# Patient Record
Sex: FEMALE | Race: WHITE
[De-identification: names, ages, dates, MRNs, and addresses within clinical notes are randomized per-mention and may not be internally consistent; named-entity substitution may affect disease eponyms.]

---

## 2016-06-20 VITALS
DIASTOLIC BLOOD PRESSURE: 102 MMHG | DIASTOLIC BLOOD PRESSURE: 102 MMHG | SYSTOLIC BLOOD PRESSURE: 178 MMHG | SYSTOLIC BLOOD PRESSURE: 178 MMHG

## 2019-06-11 ENCOUNTER — HOSPITAL ENCOUNTER (OUTPATIENT)
Dept: HOSPITAL 61 - KCIC | Age: 54
Discharge: HOME | End: 2019-06-11
Attending: FAMILY MEDICINE
Payer: COMMERCIAL

## 2019-06-11 DIAGNOSIS — M25.761: ICD-10-CM

## 2019-06-11 DIAGNOSIS — M17.11: Primary | ICD-10-CM

## 2019-06-11 PROCEDURE — 73562 X-RAY EXAM OF KNEE 3: CPT

## 2019-06-12 NOTE — KCIC
KNEE RIGHT 3V

 

History: Right knee pain

 

Comparison: None.

 

Findings:

3 views right knee are submitted. There is mild osteoarthritic change of 

all 3 compartments, larger osteophytes of lateral compartment and 

patellofemoral articulation. No acute fracture or dislocation is 

identified. There is suspected small suprapatellar joint effusion.

 

Impression: 

 

1.  There is tricompartmental osteoarthritic change. There is suspected 

small suprapatellar joint effusion.

 

Electronically signed by: Franklyn Salinas MD (6/12/2019 10:00 AM) [facility]

## 2019-11-05 ENCOUNTER — HOSPITAL ENCOUNTER (OUTPATIENT)
Dept: HOSPITAL 61 - US | Age: 54
Discharge: HOME | End: 2019-11-05
Attending: FAMILY MEDICINE
Payer: COMMERCIAL

## 2019-11-05 DIAGNOSIS — I87.2: ICD-10-CM

## 2019-11-05 DIAGNOSIS — E11.622: Primary | ICD-10-CM

## 2019-11-05 DIAGNOSIS — L97.819: ICD-10-CM

## 2019-11-05 DIAGNOSIS — I70.291: ICD-10-CM

## 2019-11-05 PROCEDURE — 93926 LOWER EXTREMITY STUDY: CPT

## 2019-11-05 PROCEDURE — 93971 EXTREMITY STUDY: CPT

## 2019-11-05 NOTE — RAD
EXAM: 

1. Right lower extremity arterial Doppler.

2. Right lower extremity venous Doppler.

3. Right lower extremity venous reflux analysis.

 

HISTORY: Nonhealing right lower extremity ulcers.

 

COMPARISON: None.

 

FINDINGS: Grayscale and Doppler analysis of the right lower extremity 

arterial system was performed.

 

There are biphasic waveforms from the common femoral artery through the 

popliteal artery. Waveforms become monophasic within the trifurcation 

vessels. The dorsalis pedis artery is patent.

 

Grayscale and Doppler analysis of the right lower extremity deep venous 

system was performed with graded compression and augmentation. The common 

femoral, greater saphenous, superficial femoral, popliteal and calf veins 

were assessed.

 

There is no evidence of deep venous thrombosis.

 

The greater saphenous vein measures 7 mm at the junction. No reflux is 

observed with Valsalva. The lesser saphenous vein measures 3 mm. Two calf 

perforators measure 2 mm 3 cm proximal to the ankle and 3.5 mm 14 cm 

proximal to the ankle. Posteriorly another 26 cm proximal ankle measures 

2.5 mm. Subcutaneous edema is noted.

 

IMPRESSION: 

1. Findings consistent with mildly flow-limiting stenosis proximal to the 

right common femoral artery. They become significantly flow-limiting 

within the proximal trifurcation.

2. No evidence of right lower extremity deep venous thrombosis.

3. No greater saphenous reflux. Calf perforators as above.

 

Electronically signed by: BORIS Noguera MD (11/5/2019 8:38 AM) [facility]

## 2019-11-05 NOTE — RAD
EXAM: 

1. Right lower extremity arterial Doppler.

2. Right lower extremity venous Doppler.

3. Right lower extremity venous reflux analysis.

 

HISTORY: Nonhealing right lower extremity ulcers.

 

COMPARISON: None.

 

FINDINGS: Grayscale and Doppler analysis of the right lower extremity 

arterial system was performed.

 

There are biphasic waveforms from the common femoral artery through the 

popliteal artery. Waveforms become monophasic within the trifurcation 

vessels. The dorsalis pedis artery is patent.

 

Grayscale and Doppler analysis of the right lower extremity deep venous 

system was performed with graded compression and augmentation. The common 

femoral, greater saphenous, superficial femoral, popliteal and calf veins 

were assessed.

 

There is no evidence of deep venous thrombosis.

 

The greater saphenous vein measures 7 mm at the junction. No reflux is 

observed with Valsalva. The lesser saphenous vein measures 3 mm. Two calf 

perforators measure 2 mm 3 cm proximal to the ankle and 3.5 mm 14 cm 

proximal to the ankle. Posteriorly another 26 cm proximal ankle measures 

2.5 mm. Subcutaneous edema is noted.

 

IMPRESSION: 

1. Findings consistent with mildly flow-limiting stenosis proximal to the 

right common femoral artery. They become significantly flow-limiting 

within the proximal trifurcation.

2. No evidence of right lower extremity deep venous thrombosis.

3. No greater saphenous reflux. Calf perforators as above.

 

Electronically signed by: BORIS Noguera MD (11/5/2019 8:38 AM) [facility]

## 2019-11-05 NOTE — RAD
EXAM: 

1. Right lower extremity arterial Doppler.

2. Right lower extremity venous Doppler.

3. Right lower extremity venous reflux analysis.

 

HISTORY: Nonhealing right lower extremity ulcers.

 

COMPARISON: None.

 

FINDINGS: Grayscale and Doppler analysis of the right lower extremity 

arterial system was performed.

 

There are biphasic waveforms from the common femoral artery through the 

popliteal artery. Waveforms become monophasic within the trifurcation 

vessels. The dorsalis pedis artery is patent.

 

Grayscale and Doppler analysis of the right lower extremity deep venous 

system was performed with graded compression and augmentation. The common 

femoral, greater saphenous, superficial femoral, popliteal and calf veins 

were assessed.

 

There is no evidence of deep venous thrombosis.

 

The greater saphenous vein measures 7 mm at the junction. No reflux is 

observed with Valsalva. The lesser saphenous vein measures 3 mm. Two calf 

perforators measure 2 mm 3 cm proximal to the ankle and 3.5 mm 14 cm 

proximal to the ankle. Posteriorly another 26 cm proximal ankle measures 

2.5 mm. Subcutaneous edema is noted.

 

IMPRESSION: 

1. Findings consistent with mildly flow-limiting stenosis proximal to the 

right common femoral artery. They become significantly flow-limiting 

within the proximal trifurcation.

2. No evidence of right lower extremity deep venous thrombosis.

3. No greater saphenous reflux. Calf perforators as above.

 

Electronically signed by: BORIS Noguera MD (11/5/2019 8:38 AM) [facility]

## 2019-12-23 ENCOUNTER — HOSPITAL ENCOUNTER (OUTPATIENT)
Dept: HOSPITAL 61 - CCL | Age: 54
Discharge: HOME | End: 2019-12-23
Attending: INTERNAL MEDICINE
Payer: COMMERCIAL

## 2019-12-23 VITALS — DIASTOLIC BLOOD PRESSURE: 62 MMHG | SYSTOLIC BLOOD PRESSURE: 119 MMHG

## 2019-12-23 VITALS — DIASTOLIC BLOOD PRESSURE: 81 MMHG | SYSTOLIC BLOOD PRESSURE: 137 MMHG

## 2019-12-23 VITALS — SYSTOLIC BLOOD PRESSURE: 114 MMHG | DIASTOLIC BLOOD PRESSURE: 62 MMHG

## 2019-12-23 VITALS — DIASTOLIC BLOOD PRESSURE: 68 MMHG | SYSTOLIC BLOOD PRESSURE: 132 MMHG

## 2019-12-23 VITALS — HEIGHT: 62 IN | WEIGHT: 293 LBS | BODY MASS INDEX: 53.92 KG/M2

## 2019-12-23 VITALS — DIASTOLIC BLOOD PRESSURE: 67 MMHG | SYSTOLIC BLOOD PRESSURE: 121 MMHG

## 2019-12-23 VITALS — SYSTOLIC BLOOD PRESSURE: 111 MMHG | DIASTOLIC BLOOD PRESSURE: 67 MMHG

## 2019-12-23 VITALS — SYSTOLIC BLOOD PRESSURE: 118 MMHG | DIASTOLIC BLOOD PRESSURE: 67 MMHG

## 2019-12-23 VITALS — DIASTOLIC BLOOD PRESSURE: 71 MMHG | SYSTOLIC BLOOD PRESSURE: 110 MMHG

## 2019-12-23 VITALS — DIASTOLIC BLOOD PRESSURE: 71 MMHG | SYSTOLIC BLOOD PRESSURE: 139 MMHG

## 2019-12-23 VITALS — DIASTOLIC BLOOD PRESSURE: 77 MMHG | SYSTOLIC BLOOD PRESSURE: 137 MMHG

## 2019-12-23 VITALS — SYSTOLIC BLOOD PRESSURE: 136 MMHG | DIASTOLIC BLOOD PRESSURE: 86 MMHG

## 2019-12-23 DIAGNOSIS — I70.211: Primary | ICD-10-CM

## 2019-12-23 LAB
ANION GAP SERPL CALC-SCNC: 13 MMOL/L (ref 6–14)
BUN SERPL-MCNC: 22 MG/DL (ref 7–20)
CALCIUM SERPL-MCNC: 9.5 MG/DL (ref 8.5–10.1)
CHLORIDE SERPL-SCNC: 100 MMOL/L (ref 98–107)
CO2 SERPL-SCNC: 26 MMOL/L (ref 21–32)
CREAT SERPL-MCNC: 0.9 MG/DL (ref 0.6–1)
ERYTHROCYTE [DISTWIDTH] IN BLOOD BY AUTOMATED COUNT: 15.4 % (ref 11.5–14.5)
GFR SERPLBLD BASED ON 1.73 SQ M-ARVRAT: 65.5 ML/MIN
GLUCOSE SERPL-MCNC: 179 MG/DL (ref 70–99)
HCT VFR BLD CALC: 42.9 % (ref 36–47)
HGB BLD-MCNC: 14 G/DL (ref 12–15.5)
MCH RBC QN AUTO: 29 PG (ref 25–35)
MCHC RBC AUTO-ENTMCNC: 33 G/DL (ref 31–37)
MCV RBC AUTO: 89 FL (ref 79–100)
PLATELET # BLD AUTO: 411 X10^3/UL (ref 140–400)
POTASSIUM SERPL-SCNC: 3.9 MMOL/L (ref 3.5–5.1)
PROTHROMBIN TIME: 12.1 SEC (ref 11.7–14)
RBC # BLD AUTO: 4.81 X10^6/UL (ref 3.5–5.4)
SODIUM SERPL-SCNC: 139 MMOL/L (ref 136–145)
WBC # BLD AUTO: 11.9 X10^3/UL (ref 4–11)

## 2019-12-23 PROCEDURE — 80048 BASIC METABOLIC PNL TOTAL CA: CPT

## 2019-12-23 PROCEDURE — C1892 INTRO/SHEATH,FIXED,PEEL-AWAY: HCPCS

## 2019-12-23 PROCEDURE — C1769 GUIDE WIRE: HCPCS

## 2019-12-23 PROCEDURE — 36415 COLL VENOUS BLD VENIPUNCTURE: CPT

## 2019-12-23 PROCEDURE — C1713 ANCHOR/SCREW BN/BN,TIS/BN: HCPCS

## 2019-12-23 PROCEDURE — 85027 COMPLETE CBC AUTOMATED: CPT

## 2019-12-23 PROCEDURE — 75625 CONTRAST EXAM ABDOMINL AORTA: CPT

## 2019-12-23 PROCEDURE — 36245 INS CATH ABD/L-EXT ART 1ST: CPT

## 2019-12-23 PROCEDURE — 36246 INS CATH ABD/L-EXT ART 2ND: CPT

## 2019-12-23 PROCEDURE — 99153 MOD SED SAME PHYS/QHP EA: CPT

## 2019-12-23 PROCEDURE — 85610 PROTHROMBIN TIME: CPT

## 2019-12-23 PROCEDURE — 94640 AIRWAY INHALATION TREATMENT: CPT

## 2019-12-23 PROCEDURE — 75716 ARTERY X-RAYS ARMS/LEGS: CPT

## 2019-12-23 PROCEDURE — 99152 MOD SED SAME PHYS/QHP 5/>YRS: CPT

## 2019-12-23 NOTE — NUR
Discharge Note:



LEXI NEVAREZ



Discharge instructions and discharge home medications reviewed with Patient and daughter and 
a copy given. All questions have been answered and understanding verbalized. 



Patient ate lunch with no issues.



The following instructions and handouts were given: moderate sedation, radial site care and 
peripheral vascular disease.



Discontinued lines and drains: right hand, dressing clean dry intact.



Patient discharged to home with daughter via wheelchair to private vehicle.

## 2019-12-23 NOTE — PDOC
MODERATE SEDATION ASSESSMENT


RISKS/ALTERNATIVES


Risks/Alternatives


Risks and alternatives of this type of sedation and procedure discussed with:


RISK/ALTERNATIVES:  Patient





H & P ON CHART


H & P


H & P on chart and reviewed for co-morbid conditions and appropriate labs.


H&P ON CHART:  Yes





PREGNANCY STATUS


PREG STATUS ASSESSED:  N/A





MEDS/ALLERGIES REVIEWED


Meds/Allergies Reviewed


Medications and Allergies including time and route of recently administered 

narcotics and sedatives.


MEDS/ALLERGIES REVIEWED:  Yes





ASA RATING


ASA RATING:  III





AIRWAY ASSESSMENT


Airway Assessment


Airway patency, oral function limitations, presence  of caps, crowns, dentures, 

partials, and ability to extend neck assessed.


AIRWAY ASSESSMENT:  Yes





MALLAMPATI SCORE


MALLAMPATI SCORE:  II





PRE-SEDATION ASSESSMENT


PRE-SEDATION ASSESSMENT:  Yes











LINDA JANE MD           Dec 23, 2019 12:06

## 2019-12-23 NOTE — NUR
Respiratory therapy called to come do breathing treatment at bedside, patient's oxygen 
saturation = 84% and patient stated her daughter has her inhaler but her daughter left to 
run some errands.

## 2019-12-23 NOTE — CARD
MR#: [id number]

Account#: [id number]

Accession#: [id number]

Date of Study: 12/23/2019

Ordering Physician: LINDA BYRD 

Referring Physician: LINDA BYRD 

Tech: RT Miko (FRANSISCO) MARY





--------------- APPROVED REPORT --------------





Technologist: RT Miko (R) MARY

Nurse: Madeline Ness RN



Procedure(s) performed: Aortogram with bilateral lower extremity runoff

Flouro time 3.8 minutes

dose 596.78Fphj1

contrast  144 Visipaque

Moderate Sedation  71 minutes



INDICATION

The indication(s) include : Nonhealing wound and peripheral artery stenosis on arterial duplex scan.



PROCEDURE NARRATIVE

After explaining the risks, benefits and alternative options, informed consent was obtained from sary
ent. Patient was brought to the cardiac Cath Lab and her left wrist was prepped and draped in the usu
al fashion after confirming a positive modified Woody's test. Arterial access was obtained in the lef
t radial artery and 6 Northern Irish sheath was inserted. 6 Northern Irish PV catheter was used to perform descending
 aortogram. This was then advanced into the right external iliac artery and selective right lower ext
remity angiography was performed. Subsequently, with this tip positioned in the left common iliac art
diamante, selective left lower extremity angiography was performed. Patient tolerated the procedure well. 
Hemostasis was achieved using TR band. There were no immediate complications



FINDINGS

1.  No significant stenosis involving the distal descending aorta

2.  No significant stenosis involving bilateral common and external iliac arteries

3.  No significant stenosis involving bilateral common femoral arteries

4.  No significant stenosis involving bilateral superficial femoral arteries

5.  No significant stenosis involving bilateral popliteal arteries. There is three-vessel runoff belo
w the knee bilaterally.



Conclusion

No significant peripheral artery stenosis.



Signed by : Linda Byrd, 

Electronically Approved : 12/23/2019 12:05:05

## 2020-01-07 ENCOUNTER — HOSPITAL ENCOUNTER (INPATIENT)
Dept: HOSPITAL 61 - ER | Age: 55
LOS: 6 days | Discharge: HOME | DRG: 871 | End: 2020-01-13
Attending: INTERNAL MEDICINE | Admitting: INTERNAL MEDICINE
Payer: COMMERCIAL

## 2020-01-07 VITALS — HEIGHT: 62 IN | WEIGHT: 293 LBS | BODY MASS INDEX: 53.92 KG/M2

## 2020-01-07 DIAGNOSIS — E66.01: ICD-10-CM

## 2020-01-07 DIAGNOSIS — A41.9: Primary | ICD-10-CM

## 2020-01-07 DIAGNOSIS — L03.115: ICD-10-CM

## 2020-01-07 DIAGNOSIS — E78.5: ICD-10-CM

## 2020-01-07 DIAGNOSIS — G89.29: ICD-10-CM

## 2020-01-07 DIAGNOSIS — Z82.49: ICD-10-CM

## 2020-01-07 DIAGNOSIS — I10: ICD-10-CM

## 2020-01-07 DIAGNOSIS — J96.01: ICD-10-CM

## 2020-01-07 DIAGNOSIS — K21.9: ICD-10-CM

## 2020-01-07 DIAGNOSIS — G43.909: ICD-10-CM

## 2020-01-07 DIAGNOSIS — I87.8: ICD-10-CM

## 2020-01-07 DIAGNOSIS — I25.10: ICD-10-CM

## 2020-01-07 DIAGNOSIS — F17.210: ICD-10-CM

## 2020-01-07 DIAGNOSIS — Z83.3: ICD-10-CM

## 2020-01-07 DIAGNOSIS — F32.9: ICD-10-CM

## 2020-01-07 DIAGNOSIS — J44.9: ICD-10-CM

## 2020-01-07 DIAGNOSIS — E11.42: ICD-10-CM

## 2020-01-07 LAB
ANION GAP SERPL CALC-SCNC: 9 MMOL/L (ref 6–14)
BASOPHILS # BLD AUTO: 0.1 X10^3/UL (ref 0–0.2)
BASOPHILS NFR BLD: 1 % (ref 0–3)
BUN SERPL-MCNC: 17 MG/DL (ref 7–20)
BUN/CREAT SERPL: 15 (ref 6–20)
CALCIUM SERPL-MCNC: 9.2 MG/DL (ref 8.5–10.1)
CHLORIDE SERPL-SCNC: 100 MMOL/L (ref 98–107)
CO2 SERPL-SCNC: 27 MMOL/L (ref 21–32)
CREAT SERPL-MCNC: 1.1 MG/DL (ref 0.6–1)
EOSINOPHIL NFR BLD: 0 % (ref 0–3)
EOSINOPHIL NFR BLD: 0 X10^3/UL (ref 0–0.7)
ERYTHROCYTE [DISTWIDTH] IN BLOOD BY AUTOMATED COUNT: 15.7 % (ref 11.5–14.5)
GFR SERPLBLD BASED ON 1.73 SQ M-ARVRAT: 52 ML/MIN
GLUCOSE SERPL-MCNC: 197 MG/DL (ref 70–99)
HCT VFR BLD CALC: 37.5 % (ref 36–47)
HGB BLD-MCNC: 12.3 G/DL (ref 12–15.5)
LYMPHOCYTES # BLD: 1 X10^3/UL (ref 1–4.8)
LYMPHOCYTES NFR BLD AUTO: 8 % (ref 24–48)
MCH RBC QN AUTO: 29 PG (ref 25–35)
MCHC RBC AUTO-ENTMCNC: 33 G/DL (ref 31–37)
MCV RBC AUTO: 88 FL (ref 79–100)
MONO #: 0.6 X10^3/UL (ref 0–1.1)
MONOCYTES NFR BLD: 5 % (ref 0–9)
NEUT #: 11.3 X10^3/UL (ref 1.8–7.7)
NEUTROPHILS NFR BLD AUTO: 87 % (ref 31–73)
PLATELET # BLD AUTO: 315 X10^3/UL (ref 140–400)
POTASSIUM SERPL-SCNC: 4.1 MMOL/L (ref 3.5–5.1)
RBC # BLD AUTO: 4.28 X10^6/UL (ref 3.5–5.4)
SODIUM SERPL-SCNC: 136 MMOL/L (ref 136–145)
WBC # BLD AUTO: 13.1 X10^3/UL (ref 4–11)

## 2020-01-07 PROCEDURE — 85379 FIBRIN DEGRADATION QUANT: CPT

## 2020-01-07 PROCEDURE — 82565 ASSAY OF CREATININE: CPT

## 2020-01-07 PROCEDURE — 86060 ANTISTREPTOLYSIN O TITER: CPT

## 2020-01-07 PROCEDURE — 96375 TX/PRO/DX INJ NEW DRUG ADDON: CPT

## 2020-01-07 PROCEDURE — 87040 BLOOD CULTURE FOR BACTERIA: CPT

## 2020-01-07 PROCEDURE — G0378 HOSPITAL OBSERVATION PER HR: HCPCS

## 2020-01-07 PROCEDURE — 94640 AIRWAY INHALATION TREATMENT: CPT

## 2020-01-07 PROCEDURE — 93005 ELECTROCARDIOGRAM TRACING: CPT

## 2020-01-07 PROCEDURE — 80048 BASIC METABOLIC PNL TOTAL CA: CPT

## 2020-01-07 PROCEDURE — 83605 ASSAY OF LACTIC ACID: CPT

## 2020-01-07 PROCEDURE — 96368 THER/DIAG CONCURRENT INF: CPT

## 2020-01-07 PROCEDURE — 87641 MR-STAPH DNA AMP PROBE: CPT

## 2020-01-07 PROCEDURE — 71045 X-RAY EXAM CHEST 1 VIEW: CPT

## 2020-01-07 PROCEDURE — 87077 CULTURE AEROBIC IDENTIFY: CPT

## 2020-01-07 PROCEDURE — 93971 EXTREMITY STUDY: CPT

## 2020-01-07 PROCEDURE — 87205 SMEAR GRAM STAIN: CPT

## 2020-01-07 PROCEDURE — 82962 GLUCOSE BLOOD TEST: CPT

## 2020-01-07 PROCEDURE — 85025 COMPLETE CBC W/AUTO DIFF WBC: CPT

## 2020-01-07 PROCEDURE — 94760 N-INVAS EAR/PLS OXIMETRY 1: CPT

## 2020-01-07 PROCEDURE — 96365 THER/PROPH/DIAG IV INF INIT: CPT

## 2020-01-07 PROCEDURE — 80202 ASSAY OF VANCOMYCIN: CPT

## 2020-01-07 PROCEDURE — 36415 COLL VENOUS BLD VENIPUNCTURE: CPT

## 2020-01-07 PROCEDURE — 80053 COMPREHEN METABOLIC PANEL: CPT

## 2020-01-07 PROCEDURE — 85007 BL SMEAR W/DIFF WBC COUNT: CPT

## 2020-01-07 PROCEDURE — 82550 ASSAY OF CK (CPK): CPT

## 2020-01-07 PROCEDURE — 84145 PROCALCITONIN (PCT): CPT

## 2020-01-07 NOTE — PHYS DOC
Adult General


Chief Complaint


Chief Complaint:  CELLULITIS





HPI


HPI





54-year-old female presents to the emergency department with complaints of lower

extremity infection. Patient has a history of diabetes, depression, GERD, 

chronic pain.  Patient has as an outpatient and being treated for lower 

extremity wounds however she's noticed increasing erythema and pain to her right

lower extremity. She did have a procedure done by Dr. Montemayor with angiography 

on 2018.  Patient states the wound care has been ongoing for a few 

months.  She describes chills, pain to RLE, open wounds with drainage.  States 

she has had vascular imaging to eval arterial blood flow which was negative..  

Patient does complain of nausea on exam





Review of Systems


Review of Systems





Constitutional: chills


Respiratory: Denies cough or shortness of breath []


Cardiovascular: No additional information not addressed in HPI []


GI: Denies abdominal pain, + nausea, no vomiting, bloody stools or diarrhea []


Musculoskeletal: right lower ext pain


Integument: erythema/warmth appreciated to right lower ext, drainage of wound to

 RLE


Neurologic: Denies headache, focal weakness or sensory changes []





All other systems were reviewed and found to be within normal limits, except as 

documented in this note.





Current Medications


Current Medications





Current Medications








 Medications


  (Trade)  Dose


 Ordered  Sig/Ashley  Start Time


 Stop Time Status Last Admin


Dose Admin


 


 Vancomycin HCl


  (Vanco Per


 Pharmacy)  1 each  PRN DAILY  PRN  20 23:45


    20 02:23


1 EACH











Allergies


Allergies





Allergies








Coded Allergies Type Severity Reaction Last Updated Verified


 


  No Known Drug Allergies    19 No











Physical Exam


Physical Exam





Constitutional: Well developed, well nourished, mild distress 2/2 pain, non-

toxic appearance. []


Cardiovascular: Tachycardia


Lungs & Thorax:  Bilateral breath sounds clear to auscultation []


Abdomen: Bowel sounds normal, soft, no tenderness, no masses, no pulsatile 

masses. [] 


Skin: Warm/Erythema, drainage from right lower ext wound, erythema has moved up 

past the knee and into the thigh


Back: No tenderness, no CVA tenderness. [] 


Extremities: No tenderness, no edema. [] 


Neurologic: Alert and oriented X 3, no focal deficits noted. []


Psychologic: Affect normal, judgement normal, mood normal. []





Current Patient Data


Vital Signs





                                   Vital Signs








  Date Time  Temp Pulse Resp B/P (MAP) Pulse Ox O2 Delivery O2 Flow Rate FiO2


 


1/7/20 23:33  122 23 135/63 (87) 93 Room Air  


 


20 22:50 99.9       





 99.9       








Lab Values





                                Laboratory Tests








Test


 20


23:30


 


White Blood Count


 13.1 x10^3/uL


(4.0-11.0)  H


 


Red Blood Count


 4.28 x10^6/uL


(3.50-5.40)


 


Hemoglobin


 12.3 g/dL


(12.0-15.5)


 


Hematocrit


 37.5 %


(36.0-47.0)


 


Mean Corpuscular Volume


 88 fL ()





 


Mean Corpuscular Hemoglobin 29 pg (25-35)  


 


Mean Corpuscular Hemoglobin


Concent 33 g/dL


(31-37)


 


Red Cell Distribution Width


 15.7 %


(11.5-14.5)  H


 


Platelet Count


 315 x10^3/uL


(140-400)


 


Neutrophils (%) (Auto) 87 % (31-73)  H


 


Lymphocytes (%) (Auto) 8 % (24-48)  L


 


Monocytes (%) (Auto) 5 % (0-9)  


 


Eosinophils (%) (Auto) 0 % (0-3)  


 


Basophils (%) (Auto) 1 % (0-3)  


 


Neutrophils # (Auto)


 11.3 x10^3/uL


(1.8-7.7)  H


 


Lymphocytes # (Auto)


 1.0 x10^3/uL


(1.0-4.8)


 


Monocytes # (Auto)


 0.6 x10^3/uL


(0.0-1.1)


 


Eosinophils # (Auto)


 0.0 x10^3/uL


(0.0-0.7)


 


Basophils # (Auto)


 0.1 x10^3/uL


(0.0-0.2)


 


Segmented Neutrophils % 74 % (35-66)  H


 


Band Neutrophils % 4 % (0-9)  


 


Lymphocytes % 15 % (24-48)  L


 


Monocytes % 7 % (0-10)  


 


Toxic Granulation Slight  


 


Platelet Estimate


 Adequate


(ADEQUATE)


 


D-Dimer (Flavia)


 1.09 ug/mlFEU


(0.00-0.50)  H


 


Sodium Level


 136 mmol/L


(136-145)


 


Potassium Level


 4.1 mmol/L


(3.5-5.1)


 


Chloride Level


 100 mmol/L


()


 


Carbon Dioxide Level


 27 mmol/L


(21-32)


 


Anion Gap 9 (6-14)  


 


Blood Urea Nitrogen


 17 mg/dL


(7-20)


 


Creatinine


 1.1 mg/dL


(0.6-1.0)  H


 


Estimated GFR


(Cockcroft-Gault) 52.0  





 


BUN/Creatinine Ratio 15 (6-20)  


 


Glucose Level


 197 mg/dL


(70-99)  H


 


Lactic Acid Level


 1.6 mmol/L


(0.4-2.0)


 


Calcium Level


 9.2 mg/dL


(8.5-10.1)


 


Total Bilirubin


 0.3 mg/dL


(0.2-1.0)


 


Aspartate Amino Transferase


(AST) 19 U/L (15-37)





 


Alanine Aminotransferase (ALT)


 19 U/L (14-59)





 


Alkaline Phosphatase


 69 U/L


()


 


Creatine Kinase


 193 U/L


()  H


 


Total Protein


 7.5 g/dL


(6.4-8.2)


 


Albumin


 3.3 g/dL


(3.4-5.0)  L


 


Albumin/Globulin Ratio


 0.8 (1.0-1.7)


L


 


Procalcitonin


 0.63 ng/mL


(0.00-0.10)  H





                                Laboratory Tests


20 23:30








                                Laboratory Tests


20 23:30














EKG


EKG


EKG with Sinus Tachycardia 120's, normal axis appreciated, no STEMI, 

interpretation time 0005[]





Radiology/Procedures


Radiology/Procedures


[facility]


                    [address] [place] [address]


                                 [phone]


                                        


                                 IMAGING REPORT





                                     Signed





PATIENT: LEXI NEVAREZ AACCOUNT: [id number]     MRN#: [id number]


: 1965           LOCATION:  [facility]         AGE: 54


SEX: F                    EXAM DT: 20         ACCESSION#: [id number]


STATUS: ADM IN            ORD. PHYSICIAN: ANDRE MENENDEZ MD


REASON: elevated ddimer, edema, cellulitis


PROCEDURE: VENOUS LOWER EXTREMITY RIGHT





Right lower extremity venous duplex Doppler ultrasound


 


HISTORY: Elevated d-dimer, right leg edema and swelling.


 


FINDINGS: No DVT evident with compressibility, patent color Doppler blood 


flow and augmentation of blood flow the right common femoral vein, 


profunda femoral vein, superficial femoral vein and popliteal vein. No DVT


evident with patent color Doppler blood flow of the posterior tibial 


peroneal veins in the calf. Right calf edema.


 


IMPRESSION: Negative right leg for DVT.


 


Electronically signed by: Wilma Soriano MD (2020 1:53 AM) [facility]














DICTATED and SIGNED BY:     WILMA SORIANO MD


DATE:     20 0153


[]





Course & Med Decision Making


Course & Med Decision Making


Pertinent Labs and Imaging studies reviewed. (See chart for details)





[]54-year-old female presents to the emergency department with complaints of 

lower extremity infection. Patient has a history of diabetes, depression, GERD, 

chronic pain.  Patient has as an outpatient and being treated for lower 

extremity wounds however she's noticed increasing erythema and pain to her right

 lower extremity. She did have a procedure done by Dr. Montemayor with angiography 

on 2018.  Patient states the wound care has been ongoing for a few 

months.  She describes chills, pain to RLE, open wounds with drainage.  States 

she has had vascular imaging to eval arterial blood flow which was negative..  

Patient does complain of nausea on exam





Labs reviewed


Lactic acid within normal limits


Patient meets sepsis criteria 


IVF 30ml/kg bolus (ideal body weight)


Cultures obtained


Abx intitiated





Dragon Disclaimer


Dragon Disclaimer


This electronic medical record was generated, in whole or in part, using a voice

 recognition dictation system.





Date and Time of Reassessment


Date:  2020


Time:  01:00





Fluid Challenge


Is the fluid challenge complet:  No


IBW Target Volume Used:  Yes


BMI > 30:  Yes





Vital Signs


Vital Signs:





Vital Signs








  Date Time  Temp Pulse Resp B/P (MAP) Pulse Ox O2 Delivery O2 Flow Rate FiO2


 


20 23:33  122 23 135/63 (87) 93 Room Air  


 


20 22:50 99.9       





 99.9       








Temperature Source:  Oral





Respirations


Respiratory Effort:  Normal





Cardiovascular


Pulse Rhythm:  Irregular


Heart:  No rubs, clicks or gallop





Lung Sounds


Breath Sounds:  Clear





Capillary Refil


Capillary Refill:  Rt Hand < 3 seconds





Peripheral Pulse


Pulse Location:  Dorsalis Pedis


Pulse Strength:  Normal (2+)


Pulse Assessment Method:  NIBP





Integumentary


Skin Moisture:  Dry





Departure


Departure


Impression:  


   Primary Impression:  


   Sepsis


   Additional Impression:  


   Cellulitis of right lower extremity


Disposition:   ADMITTED AS INPATIENT


Admitting Physician:  HIMS


Condition:  STABLE


Referrals:  


BETTINA SCHULER MD (PCP)


Critical Care Time


 Critical care time was 35 minutes exclusive of procedures.





Problem Qualifiers








   Primary Impression:  


   Sepsis


   Sepsis type:  sepsis due to unspecified organism  Sepsis acute organ 

   dysfunction status:  without acute organ dysfunction  Qualified Codes:  A41.9

    - Sepsis, unspecified organism








ANDRE MENENDEZ MD               2020 23:54

## 2020-01-08 VITALS — DIASTOLIC BLOOD PRESSURE: 55 MMHG | SYSTOLIC BLOOD PRESSURE: 96 MMHG

## 2020-01-08 VITALS — DIASTOLIC BLOOD PRESSURE: 57 MMHG | SYSTOLIC BLOOD PRESSURE: 132 MMHG

## 2020-01-08 VITALS — DIASTOLIC BLOOD PRESSURE: 67 MMHG | SYSTOLIC BLOOD PRESSURE: 132 MMHG

## 2020-01-08 VITALS — DIASTOLIC BLOOD PRESSURE: 67 MMHG | SYSTOLIC BLOOD PRESSURE: 117 MMHG

## 2020-01-08 VITALS — DIASTOLIC BLOOD PRESSURE: 49 MMHG | SYSTOLIC BLOOD PRESSURE: 111 MMHG

## 2020-01-08 VITALS — SYSTOLIC BLOOD PRESSURE: 89 MMHG | DIASTOLIC BLOOD PRESSURE: 40 MMHG

## 2020-01-08 LAB
% BANDS: 4 % (ref 0–9)
% LYMPHS: 15 % (ref 24–48)
% MONOS: 7 % (ref 0–10)
% SEGS: 74 % (ref 35–66)
ALBUMIN SERPL-MCNC: 3.3 G/DL (ref 3.4–5)
ALBUMIN/GLOB SERPL: 0.8 {RATIO} (ref 1–1.7)
ALP SERPL-CCNC: 69 U/L (ref 46–116)
ALT SERPL-CCNC: 19 U/L (ref 14–59)
AST SERPL-CCNC: 19 U/L (ref 15–37)
BILIRUB SERPL-MCNC: 0.3 MG/DL (ref 0.2–1)
CK SERPL-CCNC: 193 U/L (ref 26–192)
GLOBULIN SER-MCNC: 4.2 G/DL (ref 2.2–3.8)
PLATELET # BLD EST: ADEQUATE 10*3/UL
PROT SERPL-MCNC: 7.5 G/DL (ref 6.4–8.2)
TOXIC GRANULES BLD QL SMEAR: SLIGHT

## 2020-01-08 RX ADMIN — BACITRACIN SCH MLS/HR: 5000 INJECTION, POWDER, FOR SOLUTION INTRAMUSCULAR at 00:17

## 2020-01-08 RX ADMIN — ALBUTEROL SULFATE SCH MG: 108 AEROSOL, METERED RESPIRATORY (INHALATION) at 21:24

## 2020-01-08 RX ADMIN — INSULIN GLARGINE SCH UNIT: 100 INJECTION, SOLUTION SUBCUTANEOUS at 11:27

## 2020-01-08 RX ADMIN — ALBUTEROL SULFATE SCH MG: 108 AEROSOL, METERED RESPIRATORY (INHALATION) at 23:53

## 2020-01-08 RX ADMIN — GABAPENTIN SCH MG: 300 CAPSULE ORAL at 20:35

## 2020-01-08 RX ADMIN — INSULIN LISPRO SCH UNITS: 100 INJECTION, SOLUTION INTRAVENOUS; SUBCUTANEOUS at 18:11

## 2020-01-08 RX ADMIN — BUPROPION HYDROCHLORIDE SCH MG: 150 TABLET, FILM COATED, EXTENDED RELEASE ORAL at 08:37

## 2020-01-08 RX ADMIN — INSULIN GLARGINE SCH UNIT: 100 INJECTION, SOLUTION SUBCUTANEOUS at 20:50

## 2020-01-08 RX ADMIN — VANCOMYCIN HYDROCHLORIDE PRN EACH: 1 INJECTION, POWDER, LYOPHILIZED, FOR SOLUTION INTRAVENOUS at 02:23

## 2020-01-08 RX ADMIN — ALBUTEROL SULFATE SCH MG: 108 AEROSOL, METERED RESPIRATORY (INHALATION) at 16:34

## 2020-01-08 RX ADMIN — VANCOMYCIN HYDROCHLORIDE SCH MLS/HR: 1 INJECTION, POWDER, FOR SOLUTION INTRAVENOUS at 12:26

## 2020-01-08 RX ADMIN — KETOROLAC TROMETHAMINE PRN MG: 30 INJECTION, SOLUTION INTRAMUSCULAR at 15:56

## 2020-01-08 RX ADMIN — CETIRIZINE HYDROCHLORIDE SCH MG: 10 TABLET, FILM COATED ORAL at 11:24

## 2020-01-08 RX ADMIN — ALBUTEROL SULFATE SCH MG: 108 AEROSOL, METERED RESPIRATORY (INHALATION) at 08:45

## 2020-01-08 RX ADMIN — MORPHINE SULFATE PRN MG: 4 INJECTION, SOLUTION INTRAMUSCULAR; INTRAVENOUS at 04:51

## 2020-01-08 RX ADMIN — INSULIN LISPRO SCH UNITS: 100 INJECTION, SOLUTION INTRAVENOUS; SUBCUTANEOUS at 20:49

## 2020-01-08 RX ADMIN — NORTRIPTYLINE HYDROCHLORIDE SCH MG: 25 CAPSULE ORAL at 20:35

## 2020-01-08 RX ADMIN — HYDROMORPHONE HYDROCHLORIDE PRN MG: 2 INJECTION INTRAMUSCULAR; INTRAVENOUS; SUBCUTANEOUS at 17:26

## 2020-01-08 RX ADMIN — ALBUTEROL SULFATE SCH MG: 108 AEROSOL, METERED RESPIRATORY (INHALATION) at 12:23

## 2020-01-08 RX ADMIN — BACITRACIN SCH MLS/HR: 5000 INJECTION, POWDER, FOR SOLUTION INTRAMUSCULAR at 01:00

## 2020-01-08 RX ADMIN — GABAPENTIN SCH MG: 300 CAPSULE ORAL at 08:43

## 2020-01-08 RX ADMIN — GABAPENTIN SCH MG: 300 CAPSULE ORAL at 14:48

## 2020-01-08 RX ADMIN — MORPHINE SULFATE PRN MG: 4 INJECTION, SOLUTION INTRAMUSCULAR; INTRAVENOUS at 14:47

## 2020-01-08 RX ADMIN — MONTELUKAST SODIUM SCH MG: 10 TABLET, FILM COATED ORAL at 08:37

## 2020-01-08 RX ADMIN — ENOXAPARIN SODIUM SCH MG: 100 INJECTION SUBCUTANEOUS at 20:38

## 2020-01-08 RX ADMIN — LISINOPRIL SCH MG: 20 TABLET ORAL at 08:37

## 2020-01-08 RX ADMIN — Medication SCH CAP: at 20:35

## 2020-01-08 NOTE — NUR
The patient, LEXI NEVAREZ, 55 y/o, F admitted by CLAUDIA DORAN MD, was 
given written information regarding hospital policies, unit procedures and contact persons. 
Patient was transferred to room 554 via ED bed assisted by ED staff member. 



Valuables were checked and noted. Patient is currently laying in bed watching TV, right leg 
elevated on a pillow. Patient states no needs at this time. this RN will continue to monitor 
this patient.

## 2020-01-08 NOTE — NUR
Pharmacy Vancomycin Dosing Note



S:Consulted to monitor and dose vancomycin started 01/08/20.



O:LEXI NEVAREZ is a 54 year old F with 

Cellulitis

Sepsis .



Height: 5 feet, 2 inches

Weight: 152.72570 kg

Ideal Body Weight: 50.10 

Adjusted Body Weight: 91.18 

Dosing Weight: Actual



Other Antibiotics: 

ZOSYN X1 ED



LABS:

Last BUN: 17 

Last Creatinine: 1.1 

Creatinine Clearance: 85 mL/min

Last WBC: 13.1 

Last Procalcitonin: 0.63 

Tmax (past 24 hours): 



Microbiology: 



I/O: 



Drug Levels:

Last  level:  on  at 

Last dose given 01/08/20 at 0030 



Vancomycin Dosing:

Loading Dose: 2000 mg x1

Dosing Weight: Actual

Target Trough: 15-20





A: Based on: WT AND CRCL





P: 1. Begin Vancomycin 2000 mg IV q12h

   2. Follow up Trough level on 01/09/20 at 1130 

   3. Pharmacy will continue to monitor, follow and adjust therapy as needed.

 

 DEVEN SANCHEZ RPH, 01/08/20 0223

-------------------------------------------------------------------------------

Signed:    01/08/20 at 0223 by DEVEN SANCHEZ RPH PHA

-------------------------------------------------------------------------------

## 2020-01-08 NOTE — NUR
Wound Care;

Consult to eval and treat for RLE wounds with acute onset cellulitis. Pt is known to 
outpatient clinic for these wounds. RLE cleansed and wound measured. Leg swollen, red, and 
taut from foot to above knee, marked with sharpie by pt's nurse. Covered with ABD and 
kerlix, to be changed daily. Plan to follow up 1/15/2020, possibly considering compression 
after redness improves. No other open areas noted on head to toe inspection. Pt laying 
supine per preference, educated on turning periodically to avoid pressure related skin 
breakdown.

## 2020-01-08 NOTE — PDOC1
History and Physical


Date of Admission


Date of Admission


DATE: 1/8/20 


TIME: 08:02





Identification/Chief Complaint


Chief Complaint


Right leg pain





Source


Source:  Patient





History of Present Illness


History of Present Illness


Ms Gutierrez 54-year-old female w/ PMHx DM2, PCOS, Depression, GERD, chronic pain on

permanent disability, smoker, RLE venous stasis who p/w lower extremity 

infection. Patient has as an outpatient and being treated for lower extremity 

wounds however she's noticed increasing erythema and pain to her right lower 

extremity. She did have a procedure done by Dr. Montemayor with angiography on 

December 23, 2018 which was negative for vascular occlusion.  Patient states the

wound care has been ongoing for 8 months.  She describes chills, pain to RLE, 

open wounds with drainage.  States she has had vascular imaging to eval arterial

blood flow which was negative and venous doppler negative for DVT.  Patient does

complain of nausea on exam


WBC 13.1, HR 120s, admitted for cellulitis failing outpatient treatment at wound

center.





Past Medical History


Cardiovascular:  HTN, Hyperlipidemia


Pulmonary:  Asthma


GI:  GERD


Heme/Onc:  No pertinent hx


Hepatobiliary:  No pertinent hx


Psych:  Depression


Musculoskeletal:   low back pain


Rheumatologic:  Other (Chronic pain)


Infectious disease:  No pertinent hx


ENT:  No pertinent hx


Renal/:  No pertinent hx


Endocrine:  Diabetes


Dermatology:  No pertinent hx





Past Surgical History


Past Surgical History:  Cholecystectomy, Tonsillectomy





Family History


Family History:  Coronary Artery Disease, Diabetes, High Cholestrol





Social History


Smoke:  <1 pack per day


ALCOHOL:  none


Drugs:  None





Current Problem List


Problem List


Problems


Medical Problems:


(1) Cellulitis of right lower extremity


Status: Acute  





(2) Sepsis


Status: Acute  











Current Medications


Current Medications





Current Medications


Piperacillin Sod/ Tazobactam Sod 4.5 gm/Sodium Chloride 100 ml @  200 mls/hr 1X 

ONCE IV  Last administered on 1/8/20at 00:18;  Start 1/8/20 at 00:00;  Stop 

1/8/20 at 00:29;  Status DC


Vancomycin HCl (Vanco Per Pharmacy) 1 each PRN DAILY  PRN MC SEE COMMENTS Last 

administered on 1/8/20at 02:23;  Start 1/7/20 at 23:45


Sodium Chloride 1,000 ml @  1,000 mls/hr Q1H IV  Last administered on 1/8/20at 

00:17;  Start 1/8/20 at 00:00;  Stop 1/8/20 at 01:29;  Status DC


Morphine Sulfate (Morphine Sulfate) 4 mg 1X  ONCE IV  Last administered on 

1/8/20at 00:19;  Start 1/8/20 at 00:30;  Stop 1/8/20 at 00:31;  Status DC


Ondansetron HCl (Zofran) 4 mg 1X  ONCE IV  Last administered on 1/8/20at 00:19; 

Start 1/8/20 at 00:30;  Stop 1/8/20 at 00:31;  Status DC


Vancomycin HCl 2 gm/Sodium Chloride 500 ml @  250 mls/hr 1X  ONCE IV  Last 

administered on 1/8/20at 00:22;  Start 1/8/20 at 00:30;  Stop 1/8/20 at 02:29;  

Status DC


Vancomycin HCl 2 gm/Sodium Chloride 500 ml @  250 mls/hr Q12H IV ;  Start 1/8/20

at 12:00


Vancomycin HCl (Vancomycin Trough Level) 1 each 1X  ONCE MC ;  Start 1/9/20 at 

11:30;  Stop 1/9/20 at 11:31


Morphine Sulfate (Morphine Sulfate) 4 mg PRN Q4HRS  PRN IV SEVERE PAIN 7-10 Last

administered on 1/8/20at 04:51;  Start 1/8/20 at 04:45





Active Scripts


Active


Reported


Nortriptyline Hcl 25 Mg Capsule 1 Cap PO QHS


Celebrex (Celecoxib) 100 Mg Capsule 100 Mg PO BID 30 Days


Amlodipine Besylate 5 Mg Tablet 5 Mg PO DAILY


Humulin N Kwikpen (Nph, Human Insulin Isophane) 100 Unit/1 Ml Insuln.pen 32 Unit

SQ HS


Humulin N Kwikpen (Nph, Human Insulin Isophane) 100 Unit/1 Ml Insuln.pen 34 Unit

SQ [id number]


Ventolin Hfa Inhaler (Albuterol Sulfate) 18 Gm Hfa.aer.ad 2 Puff INH Q4HRS


Lisinopril 40 Mg Tablet 40 Mg PO DAILY


Fenofibrate 160 Mg Tablet 160 Mg PO DAILY


Gabapentin 300 Mg Capsule 900 Mg PO TID


Zofran (Ondansetron Hcl) 4 Mg Tablet 1 Tab PO Q6HRS


Levsin (Hyoscyamine Sulfate) 0.125 Mg Tablet 1 Tab PO Q4HRS


Flonase Allergy Relief (Fluticasone Propionate) 9.9 Ml [place].susp 2 Sprays NS 

DAILY


Robaxin-750 (Methocarbamol) 750 Mg Tablet 1 Tab PO TID


Hydrocodone-Apap   ** (Hydrocodone Bit/Acetaminophen) 1 Each Tablet 1 Tab 

PO PRN Q6HRS PRN


Ranitidine Hcl 150 Mg Tablet 2 Tab PO BID


Wellbutrin Xl (Bupropion Hcl) 300 Mg Tab.er.24h 1 Tab PO DAILY


Lasix (Furosemide) 20 Mg Tablet 2 Tab PO DAILY


     may take additional tabs


Montelukast Sodium Tablet  ** (Montelukast Sodium) 10 Mg Tablet 1 Tab PO DAILY


Loratadine 10 Mg Tablet 1 Tab PO DAILY


Metformin Hcl 1,000 Mg Tablet 1,000 Mg PO BID


     resume Wednesday PM escamilla 12/25





Allergies


Allergies:  


Coded Allergies:  


     No Known Drug Allergies (Unverified , 12/20/19)





ROS


General:  YES: Chills, Fatigue, Malaise; 


   No: Night Sweats, Appetite, Other


PSYCHOLOGICAL ROS:  YES: Anxiety; 


   No: Behavioral Disorder, Concentration difficultie, Decreased libido, 

Depression, Disorientation, Hallucinations, Hostility, Irritablity, Memory 

difficulties, Mood Swings, Obsessive thoughts, Physical abuse, Sexual abuse, 

Sleep disturbances, Suicidal ideation, Other


Eyes:  No Blurry vision, No Decreased vision, No Double vision, No Dry eyes, No 

Excessive tearing, No Eye Pain, No Itchy Eyes, No Loss of vision, No Photophobi

a, No Scotomata, No Uses contacts, No Uses glasses, No Other


HEENT:  No: Heacaches, Visual Changes, Hearing change, Nasal congestion, Nasal 

discharge, Oral lesions, Sinus pain, Sore Throat, Epistaxis, Sneezing, Snoring, 

Tinnitus, Vertigo, Vocal changes, Other


ALLERGY AND IMMUNOLOGY:  No: Hives, Insect Bite Sensitivity, Itchy/Watery Eyes, 

Nasal Congestion, Post Nasal Drip, Seasonal Allergies, Other


Hematological and Lymphatic:  No: Bleeding Problems, Blood Clots, Blood 

Transfusions, Brusing, Night Sweats, Pallor, Swollen Lymph Nodes, Other


ENDOCRINE:  No: Breast Changes, Galactorrhea, Hair Pattern Changes, Hot Flashes,

Malaise/lethargy, Mood Swings, Palpitations, Polydipsia/polyuria, Skin Changes, 

Temperature Intolerance, Unexpected Weight Changes, Other


Breast:  No New/Changing Breast Lumps, No Nipple changes, No Nipple discharge, 

No Other


Respiratory:  No: Cough, Hemoptysis, Orthopnea, Pleuritic Pain, Shortness of 

breath, SOB with excertion, Sputum Changes, Stridor, Tachypnea, Wheezing, Other


Cardiovascular:  yes Edema; 


   No Chest Pain, No Palpitations, No Orthopnea, No Paroxysmal Noc. Dyspnea, No 

Lt Headedness, No Other


Gastrointestinal:  Yes Nausea; 


   No Vomiting, No Abdominal Pain, No Diarrhea, No Constipation, No Melena, No 

Hematochezia, No Other


Genitourinary:  No Dysuria, No Frequency, No Incontinence, No Hematuria, No 

Retention, No Discharge, No Urgency, No Pain, No Flank Pain, No Other, No , No ,

No , No , No , No , No 


Musculoskeletal:  Yes Gait Disturbance; 


   No Joint Pain, No Joint Stiffness, No Joint Swelling, No Muscle Pain, No 

Muscular Weakness, No Pain In:, No Swelling In:, No Other


Neurological:  No Behavorial Changes, No Bowel/Bladder ControlChng, No 

Confusion, No Dizziness, No Gait Disturbance, No Headaches, No Impaired 

Coord/balance, No Memory Loss, No Numbness/Tingling, No Seizures, No Speech 

Problems, No Tremors, No Visual Changes, No Weakness, No Other


Skin:  Yes Rash, Yes Skin Lesion Changes; 


   No Dry Skin, No Eczema, No Hair Changes, No Lumps, No Mole Changes, No 

Mottling, No Nail Changes, No Pruritus, No Other, No Acne





Physical Exam


General:  Alert, Oriented X3, Cooperative, No acute distress


HEENT:  Atraumatic, PERRLA, EOMI, Mucous membr. moist/pink


Lungs:  Clear to auscultation, Normal air movement


Heart:  S1S2, RRR, no thrills, no rubs, no gallops, no murmurs


Abdomen:  Normal bowel sounds, Soft, No tenderness, No hepatosplenomegaly, No 

masses


Extremities:  No clubbing, No cyanosis, Normal pulses, Other (RLE tender and 

swollen to thigh)


Skin:  No breakdown, Other (RLE with rash up to thigh, red, hot, swollen)


Neuro:  Normal speech, Strength at 5/5 X4 ext, Normal tone, Sensation intact, Cr

anial nerves 3-12 NL, Reflexes 2+


Psych/Mental Status:  Mental status NL, Mood NL





Vitals


Vitals





Vital Signs








  Date Time  Temp Pulse Resp B/P (MAP) Pulse Ox O2 Delivery O2 Flow Rate FiO2


 


1/8/20 04:51      Nasal Cannula 3.0 


 


1/8/20 03:00 98.6 22 17 132/57 (82) 97   





 98.6       











Labs


Labs





Laboratory Tests








Test


 1/7/20


23:30 1/8/20


07:22


 


White Blood Count


 13.1 x10^3/uL


(4.0-11.0) 





 


Red Blood Count


 4.28 x10^6/uL


(3.50-5.40) 





 


Hemoglobin


 12.3 g/dL


(12.0-15.5) 





 


Hematocrit


 37.5 %


(36.0-47.0) 





 


Mean Corpuscular Volume 88 fL ()  


 


Mean Corpuscular Hemoglobin 29 pg (25-35)  


 


Mean Corpuscular Hemoglobin


Concent 33 g/dL


(31-37) 





 


Red Cell Distribution Width


 15.7 %


(11.5-14.5) 





 


Platelet Count


 315 x10^3/uL


(140-400) 





 


Neutrophils (%) (Auto) 87 % (31-73)  


 


Lymphocytes (%) (Auto) 8 % (24-48)  


 


Monocytes (%) (Auto) 5 % (0-9)  


 


Eosinophils (%) (Auto) 0 % (0-3)  


 


Basophils (%) (Auto) 1 % (0-3)  


 


Neutrophils # (Auto)


 11.3 x10^3/uL


(1.8-7.7) 





 


Lymphocytes # (Auto)


 1.0 x10^3/uL


(1.0-4.8) 





 


Monocytes # (Auto)


 0.6 x10^3/uL


(0.0-1.1) 





 


Eosinophils # (Auto)


 0.0 x10^3/uL


(0.0-0.7) 





 


Basophils # (Auto)


 0.1 x10^3/uL


(0.0-0.2) 





 


Segmented Neutrophils % 74 % (35-66)  


 


Band Neutrophils % 4 % (0-9)  


 


Lymphocytes % 15 % (24-48)  


 


Monocytes % 7 % (0-10)  


 


Toxic Granulation Slight  


 


Platelet Estimate


 Adequate


(ADEQUATE) 





 


D-Dimer (Flavia)


 1.09 ug/mlFEU


(0.00-0.50) 





 


Sodium Level


 136 mmol/L


(136-145) 





 


Potassium Level


 4.1 mmol/L


(3.5-5.1) 





 


Chloride Level


 100 mmol/L


() 





 


Carbon Dioxide Level


 27 mmol/L


(21-32) 





 


Anion Gap 9 (6-14)  


 


Blood Urea Nitrogen


 17 mg/dL


(7-20) 





 


Creatinine


 1.1 mg/dL


(0.6-1.0) 





 


Estimated GFR


(Cockcroft-Gault) 52.0 


 





 


BUN/Creatinine Ratio 15 (6-20)  


 


Glucose Level


 197 mg/dL


(70-99) 





 


Lactic Acid Level


 1.6 mmol/L


(0.4-2.0) 





 


Calcium Level


 9.2 mg/dL


(8.5-10.1) 





 


Total Bilirubin


 0.3 mg/dL


(0.2-1.0) 





 


Aspartate Amino Transf


(AST/SGOT) 19 U/L (15-37) 


 





 


Alanine Aminotransferase


(ALT/SGPT) 19 U/L (14-59) 


 





 


Alkaline Phosphatase


 69 U/L


() 





 


Creatine Kinase


 193 U/L


() 





 


Total Protein


 7.5 g/dL


(6.4-8.2) 





 


Albumin


 3.3 g/dL


(3.4-5.0) 





 


Albumin/Globulin Ratio 0.8 (1.0-1.7)  


 


Procalcitonin


 0.63 ng/mL


(0.00-0.10) 





 


Glucose (Fingerstick)


 


 194 mg/dL


(70-99)








Laboratory Tests








Test


 1/7/20


23:30 1/8/20


07:22


 


White Blood Count


 13.1 x10^3/uL


(4.0-11.0) 





 


Red Blood Count


 4.28 x10^6/uL


(3.50-5.40) 





 


Hemoglobin


 12.3 g/dL


(12.0-15.5) 





 


Hematocrit


 37.5 %


(36.0-47.0) 





 


Mean Corpuscular Volume 88 fL ()  


 


Mean Corpuscular Hemoglobin 29 pg (25-35)  


 


Mean Corpuscular Hemoglobin


Concent 33 g/dL


(31-37) 





 


Red Cell Distribution Width


 15.7 %


(11.5-14.5) 





 


Platelet Count


 315 x10^3/uL


(140-400) 





 


Neutrophils (%) (Auto) 87 % (31-73)  


 


Lymphocytes (%) (Auto) 8 % (24-48)  


 


Monocytes (%) (Auto) 5 % (0-9)  


 


Eosinophils (%) (Auto) 0 % (0-3)  


 


Basophils (%) (Auto) 1 % (0-3)  


 


Neutrophils # (Auto)


 11.3 x10^3/uL


(1.8-7.7) 





 


Lymphocytes # (Auto)


 1.0 x10^3/uL


(1.0-4.8) 





 


Monocytes # (Auto)


 0.6 x10^3/uL


(0.0-1.1) 





 


Eosinophils # (Auto)


 0.0 x10^3/uL


(0.0-0.7) 





 


Basophils # (Auto)


 0.1 x10^3/uL


(0.0-0.2) 





 


Segmented Neutrophils % 74 % (35-66)  


 


Band Neutrophils % 4 % (0-9)  


 


Lymphocytes % 15 % (24-48)  


 


Monocytes % 7 % (0-10)  


 


Toxic Granulation Slight  


 


Platelet Estimate


 Adequate


(ADEQUATE) 





 


D-Dimer (Flavia)


 1.09 ug/mlFEU


(0.00-0.50) 





 


Sodium Level


 136 mmol/L


(136-145) 





 


Potassium Level


 4.1 mmol/L


(3.5-5.1) 





 


Chloride Level


 100 mmol/L


() 





 


Carbon Dioxide Level


 27 mmol/L


(21-32) 





 


Anion Gap 9 (6-14)  


 


Blood Urea Nitrogen


 17 mg/dL


(7-20) 





 


Creatinine


 1.1 mg/dL


(0.6-1.0) 





 


Estimated GFR


(Cockcroft-Gault) 52.0 


 





 


BUN/Creatinine Ratio 15 (6-20)  


 


Glucose Level


 197 mg/dL


(70-99) 





 


Lactic Acid Level


 1.6 mmol/L


(0.4-2.0) 





 


Calcium Level


 9.2 mg/dL


(8.5-10.1) 





 


Total Bilirubin


 0.3 mg/dL


(0.2-1.0) 





 


Aspartate Amino Transf


(AST/SGOT) 19 U/L (15-37) 


 





 


Alanine Aminotransferase


(ALT/SGPT) 19 U/L (14-59) 


 





 


Alkaline Phosphatase


 69 U/L


() 





 


Creatine Kinase


 193 U/L


() 





 


Total Protein


 7.5 g/dL


(6.4-8.2) 





 


Albumin


 3.3 g/dL


(3.4-5.0) 





 


Albumin/Globulin Ratio 0.8 (1.0-1.7)  


 


Procalcitonin


 0.63 ng/mL


(0.00-0.10) 





 


Glucose (Fingerstick)


 


 194 mg/dL


(70-99)











VTE Prophylaxis Ordered


VTE Prophylaxis Devices:  No


VTE Pharmacological Prophylaxi:  Yes





Assessment/Plan


Assessment/Plan


A/P:


Cellulitis of right lower extremity - will start on empiric antibiotics to cover

strep and staph. Has risk for polymicrobial infection with diabetic and wound 

history


Sepsis - will cont IVF and antibiotics


DM2 - basal bolus plus insulin. A1c was 10.1 last check


HTN - cont meds


HLD - cont statin/fibrate


Depression - cont wellbutrin


Chronic pain - will cont meds. On permanent disability for her back


Acute hypoxic respiratory failure - not on home O2, no formal COPD diagnosis 

that she knows of. Is a smoker, relapsed recently. Will obtain CXR


Smoker - counseled on cessation





FEN - ADA


PPX - Lovenox


FULL CODE


Dispo - inpatient for RLE cellulitis failing outpatient treatment











AIME DIAZ MD         Jan 8, 2020 08:09

## 2020-01-08 NOTE — RAD
CHEST AP ONLY

 

Clinical Indication: Hypoxia

 

Comparison:  None.

 

Findings: 

Portable upright film of the chest was obtained. Cardiomegaly is noted but

may in part be artifactual due to technique. No pneumothorax. Pulmonary 

vasculature is borderline. No definite effusion. Bony structures 

unremarkable.

 

IMPRESSION: 

Cardiomegaly. Borderline pulmonary vasculature. No infiltrate.

 

 

Electronically signed by: Freddy Broussard MD (1/8/2020 5:47 PM) [facility]

## 2020-01-08 NOTE — EKG
[facility]

              [address] [place] [address]

Test Date:    2020               Test Time:    00:05:36

Pat Name:     LEXI NEVAREZ      Department:   

Patient ID:   [id number]           Room:          

Gender:       F                        Technician:   

:          1965               Requested By: ANDRE MENENDEZ

Order Number: [id number]           Reading MD:     

                                 Measurements

Intervals                              Axis          

Rate:         121                      P:            115

MD:           114                      QRS:          28

QRSD:         90                       T:            76

QT:           320                                    

QTc:          457                                    

                           Interpretive Statements

SINUS TACHYCARDIA

LEFT ATRIAL ABNORMALITY

QRS(T) CONTOUR ABNORMALITY

CONSIDER ANTEROLATERAL MYOCARDIAL DAMAGE

CONSIDER INFERIOR MYOCARDIAL DAMAGE

ABNORMAL ECG

RI6.01

No previous ECG available for comparison

## 2020-01-08 NOTE — RAD
Right lower extremity venous duplex Doppler ultrasound

 

HISTORY: Elevated d-dimer, right leg edema and swelling.

 

FINDINGS: No DVT evident with compressibility, patent color Doppler blood 

flow and augmentation of blood flow the right common femoral vein, 

profunda femoral vein, superficial femoral vein and popliteal vein. No DVT

evident with patent color Doppler blood flow of the posterior tibial 

peroneal veins in the calf. Right calf edema.

 

IMPRESSION: Negative right leg for DVT.

 

Electronically signed by: Hugh Soriano MD (1/8/2020 1:53 AM) [facility]

## 2020-01-09 VITALS — SYSTOLIC BLOOD PRESSURE: 133 MMHG | DIASTOLIC BLOOD PRESSURE: 49 MMHG

## 2020-01-09 VITALS — SYSTOLIC BLOOD PRESSURE: 115 MMHG | DIASTOLIC BLOOD PRESSURE: 51 MMHG

## 2020-01-09 VITALS — SYSTOLIC BLOOD PRESSURE: 134 MMHG | DIASTOLIC BLOOD PRESSURE: 68 MMHG

## 2020-01-09 VITALS — DIASTOLIC BLOOD PRESSURE: 70 MMHG | SYSTOLIC BLOOD PRESSURE: 127 MMHG

## 2020-01-09 VITALS — SYSTOLIC BLOOD PRESSURE: 150 MMHG | DIASTOLIC BLOOD PRESSURE: 66 MMHG

## 2020-01-09 VITALS — DIASTOLIC BLOOD PRESSURE: 61 MMHG | SYSTOLIC BLOOD PRESSURE: 103 MMHG

## 2020-01-09 LAB — VANC TR: 20.4 MCG/ML (ref 10–20)

## 2020-01-09 RX ADMIN — NORTRIPTYLINE HYDROCHLORIDE SCH MG: 25 CAPSULE ORAL at 21:34

## 2020-01-09 RX ADMIN — ALBUTEROL SULFATE SCH MG: 108 AEROSOL, METERED RESPIRATORY (INHALATION) at 07:14

## 2020-01-09 RX ADMIN — INSULIN LISPRO SCH UNITS: 100 INJECTION, SOLUTION INTRAVENOUS; SUBCUTANEOUS at 12:04

## 2020-01-09 RX ADMIN — ALBUTEROL SULFATE SCH MG: 108 AEROSOL, METERED RESPIRATORY (INHALATION) at 15:20

## 2020-01-09 RX ADMIN — ENOXAPARIN SODIUM SCH MG: 100 INJECTION SUBCUTANEOUS at 21:35

## 2020-01-09 RX ADMIN — Medication SCH CAP: at 21:34

## 2020-01-09 RX ADMIN — ALBUTEROL SULFATE SCH MG: 108 AEROSOL, METERED RESPIRATORY (INHALATION) at 19:51

## 2020-01-09 RX ADMIN — INSULIN LISPRO SCH UNITS: 100 INJECTION, SOLUTION INTRAVENOUS; SUBCUTANEOUS at 17:08

## 2020-01-09 RX ADMIN — KETOROLAC TROMETHAMINE PRN MG: 30 INJECTION, SOLUTION INTRAMUSCULAR at 09:46

## 2020-01-09 RX ADMIN — INSULIN LISPRO SCH UNITS: 100 INJECTION, SOLUTION INTRAVENOUS; SUBCUTANEOUS at 07:30

## 2020-01-09 RX ADMIN — GABAPENTIN SCH MG: 300 CAPSULE ORAL at 21:34

## 2020-01-09 RX ADMIN — GABAPENTIN SCH MG: 300 CAPSULE ORAL at 14:11

## 2020-01-09 RX ADMIN — VANCOMYCIN HYDROCHLORIDE PRN EACH: 1 INJECTION, POWDER, LYOPHILIZED, FOR SOLUTION INTRAVENOUS at 12:03

## 2020-01-09 RX ADMIN — LISINOPRIL SCH MG: 20 TABLET ORAL at 08:15

## 2020-01-09 RX ADMIN — ALBUTEROL SULFATE SCH MG: 108 AEROSOL, METERED RESPIRATORY (INHALATION) at 10:54

## 2020-01-09 RX ADMIN — INSULIN GLARGINE SCH UNIT: 100 INJECTION, SOLUTION SUBCUTANEOUS at 08:13

## 2020-01-09 RX ADMIN — ALBUTEROL SULFATE SCH MG: 108 AEROSOL, METERED RESPIRATORY (INHALATION) at 03:36

## 2020-01-09 RX ADMIN — MONTELUKAST SODIUM SCH MG: 10 TABLET, FILM COATED ORAL at 08:14

## 2020-01-09 RX ADMIN — CETIRIZINE HYDROCHLORIDE SCH MG: 10 TABLET, FILM COATED ORAL at 08:15

## 2020-01-09 RX ADMIN — INSULIN GLARGINE SCH UNIT: 100 INJECTION, SOLUTION SUBCUTANEOUS at 22:05

## 2020-01-09 RX ADMIN — Medication SCH CAP: at 08:14

## 2020-01-09 RX ADMIN — HYDROMORPHONE HYDROCHLORIDE PRN MG: 2 INJECTION INTRAMUSCULAR; INTRAVENOUS; SUBCUTANEOUS at 10:24

## 2020-01-09 RX ADMIN — ENOXAPARIN SODIUM SCH MG: 100 INJECTION SUBCUTANEOUS at 08:15

## 2020-01-09 RX ADMIN — BUPROPION HYDROCHLORIDE SCH MG: 150 TABLET, FILM COATED, EXTENDED RELEASE ORAL at 08:14

## 2020-01-09 RX ADMIN — VANCOMYCIN HYDROCHLORIDE SCH MLS/HR: 1 INJECTION, POWDER, FOR SOLUTION INTRAVENOUS at 12:00

## 2020-01-09 RX ADMIN — INSULIN LISPRO SCH UNITS: 100 INJECTION, SOLUTION INTRAVENOUS; SUBCUTANEOUS at 21:00

## 2020-01-09 RX ADMIN — VANCOMYCIN HYDROCHLORIDE SCH MLS/HR: 1 INJECTION, POWDER, FOR SOLUTION INTRAVENOUS at 01:07

## 2020-01-09 RX ADMIN — GABAPENTIN SCH MG: 300 CAPSULE ORAL at 08:14

## 2020-01-09 NOTE — NUR
SW following. Discussed with RN, pt is from home and has two daughters who help. Pt declined 
need for SNU or home health and plans to go home when discharged. No further SW needs.

## 2020-01-09 NOTE — NUR
Positive blood culture large gram positive rods in one of five bottles three sets drawn 
reported to Dr. Wu, see critical documentation.  No orders at this time.

## 2020-01-09 NOTE — NUR
Pharmacy Vancomycin Dosing Note



S: Consulted to monitor and dose vancomycin started 01/08/20.



O: LEXI NEVAREZ is a 54 year old F with Cellulitis, Bacteremia, Sepsis.



Other Antibiotics: 

-



LABS:

Last BUN: 17 

Last Creatinine: 1.1 

Creatinine Clearance: 91 mL/min

Last WBC: 13.1 

Last Procalcitonin: 0.63 

Tmax (past 24 hours): 



Microbiology:  

BLOOD CX: 1/5 GRAM POSITIVE RODS



I/O: 500/- 



Drug Levels:

Last Trough level: 20.4 (TRUE TROUGH 17.7) on 01/09/20 at 1115 

Last dose given 01/09/20 at 0107 



Vancomycin Dosing:

Dosing Weight: Actual

Target Trough: 15-20





A: When adjusted for timing, vt is at goal of 15-20 mcg/mL. 



P: 1. Continue Vancomycin 2000 mg IV q12h

   2. Follow up Trough level as needed.

   3. Pharmacy will continue to monitor, follow and adjust therapy as needed.

 

 LUTHER BURRIS RPH, 01/09/20 [id number]

## 2020-01-09 NOTE — NUR
Vanco trough 20.4 reported to pharmacy, waiting for dose adjustment per their order.  
Patient verb. understanding POC.

## 2020-01-10 VITALS — DIASTOLIC BLOOD PRESSURE: 51 MMHG | SYSTOLIC BLOOD PRESSURE: 114 MMHG

## 2020-01-10 VITALS — DIASTOLIC BLOOD PRESSURE: 86 MMHG | SYSTOLIC BLOOD PRESSURE: 157 MMHG

## 2020-01-10 VITALS — DIASTOLIC BLOOD PRESSURE: 71 MMHG | SYSTOLIC BLOOD PRESSURE: 126 MMHG

## 2020-01-10 VITALS — SYSTOLIC BLOOD PRESSURE: 138 MMHG | DIASTOLIC BLOOD PRESSURE: 63 MMHG

## 2020-01-10 VITALS — DIASTOLIC BLOOD PRESSURE: 63 MMHG | SYSTOLIC BLOOD PRESSURE: 134 MMHG

## 2020-01-10 VITALS — SYSTOLIC BLOOD PRESSURE: 113 MMHG | DIASTOLIC BLOOD PRESSURE: 68 MMHG

## 2020-01-10 LAB
ANION GAP SERPL CALC-SCNC: 11 MMOL/L (ref 6–14)
BASOPHILS # BLD AUTO: 0.1 X10^3/UL (ref 0–0.2)
BASOPHILS NFR BLD: 1 % (ref 0–3)
BUN SERPL-MCNC: 23 MG/DL (ref 7–20)
CALCIUM SERPL-MCNC: 8.2 MG/DL (ref 8.5–10.1)
CHLORIDE SERPL-SCNC: 104 MMOL/L (ref 98–107)
CO2 SERPL-SCNC: 23 MMOL/L (ref 21–32)
CREAT SERPL-MCNC: 1.2 MG/DL (ref 0.6–1)
EOSINOPHIL NFR BLD: 0.1 X10^3/UL (ref 0–0.7)
EOSINOPHIL NFR BLD: 1 % (ref 0–3)
ERYTHROCYTE [DISTWIDTH] IN BLOOD BY AUTOMATED COUNT: 15.6 % (ref 11.5–14.5)
GFR SERPLBLD BASED ON 1.73 SQ M-ARVRAT: 47 ML/MIN
GLUCOSE SERPL-MCNC: 175 MG/DL (ref 70–99)
HCT VFR BLD CALC: 31.4 % (ref 36–47)
HGB BLD-MCNC: 10.4 G/DL (ref 12–15.5)
LYMPHOCYTES # BLD: 1.8 X10^3/UL (ref 1–4.8)
LYMPHOCYTES NFR BLD AUTO: 16 % (ref 24–48)
MCH RBC QN AUTO: 29 PG (ref 25–35)
MCHC RBC AUTO-ENTMCNC: 33 G/DL (ref 31–37)
MCV RBC AUTO: 88 FL (ref 79–100)
MONO #: 0.8 X10^3/UL (ref 0–1.1)
MONOCYTES NFR BLD: 8 % (ref 0–9)
NEUT #: 8 X10^3/UL (ref 1.8–7.7)
NEUTROPHILS NFR BLD AUTO: 74 % (ref 31–73)
PLATELET # BLD AUTO: 312 X10^3/UL (ref 140–400)
POTASSIUM SERPL-SCNC: 4.2 MMOL/L (ref 3.5–5.1)
RBC # BLD AUTO: 3.57 X10^6/UL (ref 3.5–5.4)
SODIUM SERPL-SCNC: 138 MMOL/L (ref 136–145)
WBC # BLD AUTO: 10.8 X10^3/UL (ref 4–11)

## 2020-01-10 RX ADMIN — VANCOMYCIN HYDROCHLORIDE PRN EACH: 1 INJECTION, POWDER, LYOPHILIZED, FOR SOLUTION INTRAVENOUS at 11:26

## 2020-01-10 RX ADMIN — ALBUTEROL SULFATE SCH MG: 108 AEROSOL, METERED RESPIRATORY (INHALATION) at 07:19

## 2020-01-10 RX ADMIN — NORTRIPTYLINE HYDROCHLORIDE SCH MG: 25 CAPSULE ORAL at 20:40

## 2020-01-10 RX ADMIN — VANCOMYCIN HYDROCHLORIDE SCH MLS/HR: 1 INJECTION, POWDER, FOR SOLUTION INTRAVENOUS at 00:47

## 2020-01-10 RX ADMIN — MONTELUKAST SODIUM SCH MG: 10 TABLET, FILM COATED ORAL at 08:45

## 2020-01-10 RX ADMIN — CETIRIZINE HYDROCHLORIDE SCH MG: 10 TABLET, FILM COATED ORAL at 08:46

## 2020-01-10 RX ADMIN — ENOXAPARIN SODIUM SCH MG: 100 INJECTION SUBCUTANEOUS at 08:46

## 2020-01-10 RX ADMIN — KETOROLAC TROMETHAMINE PRN MG: 30 INJECTION, SOLUTION INTRAMUSCULAR at 02:54

## 2020-01-10 RX ADMIN — ALBUTEROL SULFATE SCH MG: 108 AEROSOL, METERED RESPIRATORY (INHALATION) at 21:12

## 2020-01-10 RX ADMIN — INSULIN LISPRO SCH UNITS: 100 INJECTION, SOLUTION INTRAVENOUS; SUBCUTANEOUS at 12:26

## 2020-01-10 RX ADMIN — GABAPENTIN SCH MG: 300 CAPSULE ORAL at 16:13

## 2020-01-10 RX ADMIN — INSULIN LISPRO SCH UNITS: 100 INJECTION, SOLUTION INTRAVENOUS; SUBCUTANEOUS at 20:39

## 2020-01-10 RX ADMIN — Medication SCH CAP: at 08:45

## 2020-01-10 RX ADMIN — VANCOMYCIN HYDROCHLORIDE SCH MLS/HR: 1 INJECTION, POWDER, FOR SOLUTION INTRAVENOUS at 12:19

## 2020-01-10 RX ADMIN — INSULIN LISPRO SCH UNITS: 100 INJECTION, SOLUTION INTRAVENOUS; SUBCUTANEOUS at 09:00

## 2020-01-10 RX ADMIN — INSULIN GLARGINE SCH UNIT: 100 INJECTION, SOLUTION SUBCUTANEOUS at 09:00

## 2020-01-10 RX ADMIN — BUPROPION HYDROCHLORIDE SCH MG: 150 TABLET, FILM COATED, EXTENDED RELEASE ORAL at 08:46

## 2020-01-10 RX ADMIN — GABAPENTIN SCH MG: 300 CAPSULE ORAL at 08:45

## 2020-01-10 RX ADMIN — ALBUTEROL SULFATE SCH MG: 108 AEROSOL, METERED RESPIRATORY (INHALATION) at 15:20

## 2020-01-10 RX ADMIN — ALBUTEROL SULFATE SCH MG: 108 AEROSOL, METERED RESPIRATORY (INHALATION) at 00:13

## 2020-01-10 RX ADMIN — INSULIN LISPRO SCH UNITS: 100 INJECTION, SOLUTION INTRAVENOUS; SUBCUTANEOUS at 18:56

## 2020-01-10 RX ADMIN — VANCOMYCIN HYDROCHLORIDE SCH MLS/HR: 1 INJECTION, POWDER, FOR SOLUTION INTRAVENOUS at 23:51

## 2020-01-10 RX ADMIN — ALBUTEROL SULFATE SCH MG: 108 AEROSOL, METERED RESPIRATORY (INHALATION) at 11:26

## 2020-01-10 RX ADMIN — LISINOPRIL SCH MG: 20 TABLET ORAL at 08:45

## 2020-01-10 RX ADMIN — GABAPENTIN SCH MG: 300 CAPSULE ORAL at 20:40

## 2020-01-10 RX ADMIN — Medication SCH CAP: at 20:40

## 2020-01-10 RX ADMIN — INSULIN GLARGINE SCH UNIT: 100 INJECTION, SOLUTION SUBCUTANEOUS at 20:45

## 2020-01-10 RX ADMIN — ENOXAPARIN SODIUM SCH MG: 100 INJECTION SUBCUTANEOUS at 20:42

## 2020-01-11 VITALS — SYSTOLIC BLOOD PRESSURE: 122 MMHG | DIASTOLIC BLOOD PRESSURE: 72 MMHG

## 2020-01-11 VITALS — SYSTOLIC BLOOD PRESSURE: 145 MMHG | DIASTOLIC BLOOD PRESSURE: 72 MMHG

## 2020-01-11 VITALS — DIASTOLIC BLOOD PRESSURE: 46 MMHG | SYSTOLIC BLOOD PRESSURE: 83 MMHG

## 2020-01-11 VITALS — DIASTOLIC BLOOD PRESSURE: 72 MMHG | SYSTOLIC BLOOD PRESSURE: 133 MMHG

## 2020-01-11 VITALS — DIASTOLIC BLOOD PRESSURE: 74 MMHG | SYSTOLIC BLOOD PRESSURE: 124 MMHG

## 2020-01-11 VITALS — SYSTOLIC BLOOD PRESSURE: 131 MMHG | DIASTOLIC BLOOD PRESSURE: 70 MMHG

## 2020-01-11 LAB
CREAT SERPL-MCNC: 1.1 MG/DL (ref 0.6–1)
GFR SERPLBLD BASED ON 1.73 SQ M-ARVRAT: 52 ML/MIN

## 2020-01-11 RX ADMIN — ENOXAPARIN SODIUM SCH MG: 100 INJECTION SUBCUTANEOUS at 20:50

## 2020-01-11 RX ADMIN — ALBUTEROL SULFATE PRN MG: 108 AEROSOL, METERED RESPIRATORY (INHALATION) at 12:27

## 2020-01-11 RX ADMIN — KETOROLAC TROMETHAMINE PRN MG: 30 INJECTION, SOLUTION INTRAMUSCULAR at 04:26

## 2020-01-11 RX ADMIN — AMOXICILLIN AND CLAVULANATE POTASSIUM SCH TAB: 875; 125 TABLET, FILM COATED ORAL at 20:50

## 2020-01-11 RX ADMIN — INSULIN GLARGINE SCH UNIT: 100 INJECTION, SOLUTION SUBCUTANEOUS at 21:00

## 2020-01-11 RX ADMIN — FLUCONAZOLE SCH MG: 100 TABLET ORAL at 12:15

## 2020-01-11 RX ADMIN — NORTRIPTYLINE HYDROCHLORIDE SCH MG: 25 CAPSULE ORAL at 20:49

## 2020-01-11 RX ADMIN — CETIRIZINE HYDROCHLORIDE SCH MG: 10 TABLET, FILM COATED ORAL at 08:48

## 2020-01-11 RX ADMIN — GABAPENTIN SCH MG: 300 CAPSULE ORAL at 14:14

## 2020-01-11 RX ADMIN — MONTELUKAST SODIUM SCH MG: 10 TABLET, FILM COATED ORAL at 08:46

## 2020-01-11 RX ADMIN — INSULIN LISPRO SCH UNITS: 100 INJECTION, SOLUTION INTRAVENOUS; SUBCUTANEOUS at 20:47

## 2020-01-11 RX ADMIN — Medication SCH CAP: at 20:49

## 2020-01-11 RX ADMIN — INSULIN GLARGINE SCH UNIT: 100 INJECTION, SOLUTION SUBCUTANEOUS at 09:03

## 2020-01-11 RX ADMIN — BUPROPION HYDROCHLORIDE SCH MG: 150 TABLET, FILM COATED, EXTENDED RELEASE ORAL at 08:47

## 2020-01-11 RX ADMIN — LISINOPRIL SCH MG: 20 TABLET ORAL at 08:47

## 2020-01-11 RX ADMIN — Medication SCH CAP: at 08:46

## 2020-01-11 RX ADMIN — INSULIN LISPRO SCH UNITS: 100 INJECTION, SOLUTION INTRAVENOUS; SUBCUTANEOUS at 09:02

## 2020-01-11 RX ADMIN — VANCOMYCIN HYDROCHLORIDE PRN EACH: 1 INJECTION, POWDER, LYOPHILIZED, FOR SOLUTION INTRAVENOUS at 08:40

## 2020-01-11 RX ADMIN — ALBUTEROL SULFATE PRN MG: 108 AEROSOL, METERED RESPIRATORY (INHALATION) at 16:52

## 2020-01-11 RX ADMIN — ENOXAPARIN SODIUM SCH MG: 100 INJECTION SUBCUTANEOUS at 08:48

## 2020-01-11 RX ADMIN — LINEZOLID SCH MG: 600 TABLET, FILM COATED ORAL at 12:15

## 2020-01-11 RX ADMIN — GABAPENTIN SCH MG: 300 CAPSULE ORAL at 08:48

## 2020-01-11 RX ADMIN — ALBUTEROL SULFATE SCH MG: 108 AEROSOL, METERED RESPIRATORY (INHALATION) at 08:11

## 2020-01-11 RX ADMIN — INSULIN LISPRO SCH UNITS: 100 INJECTION, SOLUTION INTRAVENOUS; SUBCUTANEOUS at 12:20

## 2020-01-11 RX ADMIN — INSULIN LISPRO SCH UNITS: 100 INJECTION, SOLUTION INTRAVENOUS; SUBCUTANEOUS at 17:17

## 2020-01-11 RX ADMIN — AMOXICILLIN AND CLAVULANATE POTASSIUM SCH TAB: 875; 125 TABLET, FILM COATED ORAL at 12:15

## 2020-01-11 RX ADMIN — GABAPENTIN SCH MG: 300 CAPSULE ORAL at 20:49

## 2020-01-11 RX ADMIN — LINEZOLID SCH MG: 600 TABLET, FILM COATED ORAL at 20:49

## 2020-01-11 NOTE — CONS
DATE OF CONSULTATION:  01/11/2020



REFERRING PHYSICIAN:  Dr. Wu



REASON FOR CONSULTATION:  Cellulitis.



HISTORY OF PRESENT ILLNESS:  This patient is a 54-year-old  female with

a history of chronic wound in right lower extremity for the past 6 months, for

which she is followed by the wound care center.  Two days ago, she noticed a

sudden onset of redness, swelling, and pain associated with malaise and fever. 

She was admitted and dosed with vancomycin with some improvement.



PAST MEDICAL HISTORY:  Morbid obesity, diabetes, peripheral neuropathy, coronary

artery disease, hyperlipidemia, COPD, asthma, and chronic back pain.



PAST SURGICAL HISTORY:  Cholecystectomy and tonsillectomy.



FAMILY HISTORY:  Coronary artery disease, diabetes, and hyperlipidemia.



SOCIAL HISTORY:  The patient lives at home.  History of smoking.



ALLERGIES:  No known drug allergies.



MEDICATIONS:  Vancomycin, gabapentin, and probiotics.  Other medications are

available and have been reviewed on the MAR.



REVIEW OF SYSTEMS:  The patient states that she is feeling better.  Right leg

redness has improved some, but still swollen and draining.  Her fevers have

settled down.  Denies nausea, vomiting, or diarrhea.  She is requiring a little

bit of supplemental oxygen of 1 liter.  She denies cough, shortness of air, or

chest discomfort.  Denies nasal/sinus congestion or sore throat.



PHYSICAL EXAMINATION:

VITAL SIGNS:  Temperature is 98.2, blood pressure 131/70, heart rate 107,

respiratory rate 20, pulse oximetry is 96% on 1 liter, and BMI 71.

GENERAL:  The patient is sitting in a chair, alert, and in no apparent distress.

HEENT:  Pupils are equally round.  Oropharynx is pink and moist.  No thrush.

NECK:  Supple.

LUNGS:  Clear.

HEART:  S1, S2.

ABDOMEN:  Obese, soft, and nontender.  Bowel sounds present.

EXTREMITIES:  Right lower extremity 1+ edema.  Mild redness up to knee area. 

Several blisters draining, lower calf area.  All other extremities unremarkable.

SKIN:  Warm to touch without signs of rash.

NEUROLOGIC:  Alert and answers questions appropriately.



LABORATORY DATA:  Recent WBC 10.8 from 13.1, hemoglobin 10.4, and platelets

312,000.  Creatinine 1.10, GFR 52.0, and BUN 23.  Electrolytes unremarkable. 

Total bilirubin 0.3, AST 19, and ALT 19.  Procalcitonin 0.63.  Vancomycin trough

20.4 on 01/09/2020.  MRSA screen negative.  Blood cultures from 01/07/2020

showed large gram-positive rods in 1 of 5 bottles, identification still pending.

 Blood cultures from 01/08/2020 negative to date.  Lower extremity ultrasound

negative for DVT.  Chest x-ray showed borderline pulmonary vasculature, no

infiltrate.



IMPRESSION:

1.  Cellulitis, right lower extremity.

2.  Leukocytosis.

3.  Bacteremia with large gram-positive rods, 1 of 5 bottles, likely

contaminant.

4.  Diabetes with peripheral neuropathy.

5.  Tobaccoism.

6.  Super morbid obesity.



PLAN:

1.  Change vancomycin to p.o. Zyvox, Augmentin, and fluconazole.

2.  Local wound care and leg elevation.

3.  Weight loss.

4.  Smoking cessation.

5.  Discussed with nursing.



Thank you Dr. Wu for asking us to participate in this patient's care. 

Should she have further questions or concerns, please call.  The patient was

seen and examined and plan of care implemented by Dr. Bull Menchaca.

 



______________________________

BULL MENCHACA MD



DR:  D[place]/natalie  JOB#:  [id number] / [id number]

DD:  01/11/2020 11:55  DT:  01/11/2020 12:21

## 2020-01-12 VITALS — SYSTOLIC BLOOD PRESSURE: 140 MMHG | DIASTOLIC BLOOD PRESSURE: 78 MMHG

## 2020-01-12 VITALS — SYSTOLIC BLOOD PRESSURE: 145 MMHG | DIASTOLIC BLOOD PRESSURE: 75 MMHG

## 2020-01-12 VITALS — SYSTOLIC BLOOD PRESSURE: 112 MMHG | DIASTOLIC BLOOD PRESSURE: 58 MMHG

## 2020-01-12 VITALS — DIASTOLIC BLOOD PRESSURE: 74 MMHG | SYSTOLIC BLOOD PRESSURE: 112 MMHG

## 2020-01-12 VITALS — SYSTOLIC BLOOD PRESSURE: 150 MMHG | DIASTOLIC BLOOD PRESSURE: 86 MMHG

## 2020-01-12 VITALS — SYSTOLIC BLOOD PRESSURE: 126 MMHG | DIASTOLIC BLOOD PRESSURE: 90 MMHG

## 2020-01-12 RX ADMIN — INSULIN LISPRO SCH UNITS: 100 INJECTION, SOLUTION INTRAVENOUS; SUBCUTANEOUS at 11:30

## 2020-01-12 RX ADMIN — ENOXAPARIN SODIUM SCH MG: 100 INJECTION SUBCUTANEOUS at 11:01

## 2020-01-12 RX ADMIN — MONTELUKAST SODIUM SCH MG: 10 TABLET, FILM COATED ORAL at 10:58

## 2020-01-12 RX ADMIN — INSULIN GLARGINE SCH UNIT: 100 INJECTION, SOLUTION SUBCUTANEOUS at 21:25

## 2020-01-12 RX ADMIN — CETIRIZINE HYDROCHLORIDE SCH MG: 10 TABLET, FILM COATED ORAL at 10:57

## 2020-01-12 RX ADMIN — INSULIN LISPRO SCH UNITS: 100 INJECTION, SOLUTION INTRAVENOUS; SUBCUTANEOUS at 21:25

## 2020-01-12 RX ADMIN — KETOROLAC TROMETHAMINE PRN MG: 30 INJECTION, SOLUTION INTRAMUSCULAR at 10:54

## 2020-01-12 RX ADMIN — INSULIN LISPRO SCH UNITS: 100 INJECTION, SOLUTION INTRAVENOUS; SUBCUTANEOUS at 18:17

## 2020-01-12 RX ADMIN — GABAPENTIN SCH MG: 300 CAPSULE ORAL at 10:58

## 2020-01-12 RX ADMIN — GABAPENTIN SCH MG: 300 CAPSULE ORAL at 13:58

## 2020-01-12 RX ADMIN — Medication SCH CAP: at 10:58

## 2020-01-12 RX ADMIN — AMOXICILLIN AND CLAVULANATE POTASSIUM SCH TAB: 875; 125 TABLET, FILM COATED ORAL at 10:58

## 2020-01-12 RX ADMIN — ENOXAPARIN SODIUM SCH MG: 100 INJECTION SUBCUTANEOUS at 21:17

## 2020-01-12 RX ADMIN — Medication SCH CAP: at 21:15

## 2020-01-12 RX ADMIN — TOPIRAMATE SCH MG: 25 TABLET, FILM COATED ORAL at 11:15

## 2020-01-12 RX ADMIN — LINEZOLID SCH MG: 600 TABLET, FILM COATED ORAL at 10:58

## 2020-01-12 RX ADMIN — TOPIRAMATE SCH MG: 25 TABLET, FILM COATED ORAL at 21:15

## 2020-01-12 RX ADMIN — LINEZOLID SCH MG: 600 TABLET, FILM COATED ORAL at 21:14

## 2020-01-12 RX ADMIN — FLUCONAZOLE SCH MG: 100 TABLET ORAL at 10:57

## 2020-01-12 RX ADMIN — BUPROPION HYDROCHLORIDE SCH MG: 150 TABLET, FILM COATED, EXTENDED RELEASE ORAL at 10:59

## 2020-01-12 RX ADMIN — LISINOPRIL SCH MG: 20 TABLET ORAL at 10:59

## 2020-01-12 RX ADMIN — INSULIN LISPRO SCH UNITS: 100 INJECTION, SOLUTION INTRAVENOUS; SUBCUTANEOUS at 07:30

## 2020-01-12 RX ADMIN — INSULIN GLARGINE SCH UNIT: 100 INJECTION, SOLUTION SUBCUTANEOUS at 08:00

## 2020-01-12 RX ADMIN — GABAPENTIN SCH MG: 300 CAPSULE ORAL at 21:14

## 2020-01-12 RX ADMIN — NORTRIPTYLINE HYDROCHLORIDE SCH MG: 25 CAPSULE ORAL at 21:15

## 2020-01-12 RX ADMIN — AMOXICILLIN AND CLAVULANATE POTASSIUM SCH TAB: 875; 125 TABLET, FILM COATED ORAL at 21:15

## 2020-01-12 RX ADMIN — KETOROLAC TROMETHAMINE PRN MG: 30 INJECTION, SOLUTION INTRAMUSCULAR at 18:13

## 2020-01-13 VITALS
DIASTOLIC BLOOD PRESSURE: 82 MMHG | DIASTOLIC BLOOD PRESSURE: 82 MMHG | SYSTOLIC BLOOD PRESSURE: 140 MMHG | SYSTOLIC BLOOD PRESSURE: 140 MMHG | DIASTOLIC BLOOD PRESSURE: 82 MMHG | SYSTOLIC BLOOD PRESSURE: 140 MMHG

## 2020-01-13 VITALS — SYSTOLIC BLOOD PRESSURE: 149 MMHG | DIASTOLIC BLOOD PRESSURE: 84 MMHG

## 2020-01-13 VITALS — DIASTOLIC BLOOD PRESSURE: 78 MMHG | SYSTOLIC BLOOD PRESSURE: 114 MMHG

## 2020-01-13 LAB
ANION GAP SERPL CALC-SCNC: 7 MMOL/L (ref 6–14)
BASOPHILS # BLD AUTO: 0.1 X10^3/UL (ref 0–0.2)
BASOPHILS NFR BLD: 1 % (ref 0–3)
BUN SERPL-MCNC: 24 MG/DL (ref 7–20)
CALCIUM SERPL-MCNC: 8.9 MG/DL (ref 8.5–10.1)
CHLORIDE SERPL-SCNC: 105 MMOL/L (ref 98–107)
CO2 SERPL-SCNC: 26 MMOL/L (ref 21–32)
CREAT SERPL-MCNC: 1.1 MG/DL (ref 0.6–1)
EOSINOPHIL NFR BLD: 0.2 X10^3/UL (ref 0–0.7)
EOSINOPHIL NFR BLD: 3 % (ref 0–3)
ERYTHROCYTE [DISTWIDTH] IN BLOOD BY AUTOMATED COUNT: 15.9 % (ref 11.5–14.5)
GFR SERPLBLD BASED ON 1.73 SQ M-ARVRAT: 52 ML/MIN
GLUCOSE SERPL-MCNC: 130 MG/DL (ref 70–99)
HCT VFR BLD CALC: 30.9 % (ref 36–47)
HGB BLD-MCNC: 10.1 G/DL (ref 12–15.5)
LYMPHOCYTES # BLD: 2 X10^3/UL (ref 1–4.8)
LYMPHOCYTES NFR BLD AUTO: 21 % (ref 24–48)
MCH RBC QN AUTO: 29 PG (ref 25–35)
MCHC RBC AUTO-ENTMCNC: 33 G/DL (ref 31–37)
MCV RBC AUTO: 88 FL (ref 79–100)
MONO #: 0.7 X10^3/UL (ref 0–1.1)
MONOCYTES NFR BLD: 7 % (ref 0–9)
NEUT #: 6.4 X10^3/UL (ref 1.8–7.7)
NEUTROPHILS NFR BLD AUTO: 69 % (ref 31–73)
PLATELET # BLD AUTO: 403 X10^3/UL (ref 140–400)
POTASSIUM SERPL-SCNC: 4.5 MMOL/L (ref 3.5–5.1)
RBC # BLD AUTO: 3.49 X10^6/UL (ref 3.5–5.4)
SODIUM SERPL-SCNC: 138 MMOL/L (ref 136–145)
WBC # BLD AUTO: 9.4 X10^3/UL (ref 4–11)

## 2020-01-13 RX ADMIN — INSULIN LISPRO SCH UNITS: 100 INJECTION, SOLUTION INTRAVENOUS; SUBCUTANEOUS at 11:30

## 2020-01-13 RX ADMIN — FLUCONAZOLE SCH MG: 100 TABLET ORAL at 08:39

## 2020-01-13 RX ADMIN — TOPIRAMATE SCH MG: 25 TABLET, FILM COATED ORAL at 08:39

## 2020-01-13 RX ADMIN — BUPROPION HYDROCHLORIDE SCH MG: 150 TABLET, FILM COATED, EXTENDED RELEASE ORAL at 08:39

## 2020-01-13 RX ADMIN — LINEZOLID SCH MG: 600 TABLET, FILM COATED ORAL at 08:39

## 2020-01-13 RX ADMIN — Medication SCH CAP: at 08:39

## 2020-01-13 RX ADMIN — GABAPENTIN SCH MG: 300 CAPSULE ORAL at 08:39

## 2020-01-13 RX ADMIN — GABAPENTIN SCH MG: 300 CAPSULE ORAL at 13:38

## 2020-01-13 RX ADMIN — MONTELUKAST SODIUM SCH MG: 10 TABLET, FILM COATED ORAL at 08:39

## 2020-01-13 RX ADMIN — INSULIN LISPRO SCH UNITS: 100 INJECTION, SOLUTION INTRAVENOUS; SUBCUTANEOUS at 07:30

## 2020-01-13 RX ADMIN — ENOXAPARIN SODIUM SCH MG: 100 INJECTION SUBCUTANEOUS at 08:41

## 2020-01-13 RX ADMIN — AMOXICILLIN AND CLAVULANATE POTASSIUM SCH TAB: 875; 125 TABLET, FILM COATED ORAL at 08:38

## 2020-01-13 RX ADMIN — INSULIN GLARGINE SCH UNIT: 100 INJECTION, SOLUTION SUBCUTANEOUS at 08:50

## 2020-01-13 RX ADMIN — LISINOPRIL SCH MG: 20 TABLET ORAL at 08:41

## 2020-01-13 RX ADMIN — CETIRIZINE HYDROCHLORIDE SCH MG: 10 TABLET, FILM COATED ORAL at 08:40

## 2020-01-13 NOTE — NUR
SW following. Discussed with RN. RN advised no SW needs and anticipates pt will discharge 
home today with self care.

## 2020-01-13 NOTE — PDOC3
Discharge Summary


Visit Information


Date of Admission:  Jan 7, 2020


Date of Discharge:  Jan 13, 2020


Admitting Diagnosis Comment:


REcurrenmt RLE cellulitsi


MORBid obesity


NArc tolerant


Final Diagnosis


Problems


Medical Problems:


(1) Cellulitis of right lower extremity


Status: Acute  





(2) Sepsis


Status: Acute  











Brief Hospital Course


Allergies





                                    Allergies








Coded Allergies Type Severity Reaction Last Updated Verified


 


  No Known Drug Allergies    12/20/19 No








Vital Signs





Vital Signs








  Date Time  Temp Pulse Resp B/P (MAP) Pulse Ox O2 Delivery O2 Flow Rate FiO2


 


1/13/20 10:45 97.8 99 19 140/82 (101) 95 Nasal Cannula 1.0 





 97.8       








Lab Results





Laboratory Tests








Test


 1/11/20


17:13 1/11/20


20:36 1/12/20


07:42 1/12/20


11:32


 


Glucose (Fingerstick)


 179 mg/dL


(70-99) 164 mg/dL


(70-99) 127 mg/dL


(70-99) 172 mg/dL


(70-99)


 


Test


 1/12/20


16:51 1/12/20


20:25 1/13/20


04:50 1/13/20


07:11


 


Glucose (Fingerstick)


 162 mg/dL


(70-99) 202 mg/dL


(70-99) 


 139 mg/dL


(70-99)


 


White Blood Count


 


 


 9.4 x10^3/uL


(4.0-11.0) 





 


Red Blood Count


 


 


 3.49 x10^6/uL


(3.50-5.40) 





 


Hemoglobin


 


 


 10.1 g/dL


(12.0-15.5) 





 


Hematocrit


 


 


 30.9 %


(36.0-47.0) 





 


Mean Corpuscular Volume   88 fL ()  


 


Mean Corpuscular Hemoglobin   29 pg (25-35)  


 


Mean Corpuscular Hemoglobin


Concent 


 


 33 g/dL


(31-37) 





 


Red Cell Distribution Width


 


 


 15.9 %


(11.5-14.5) 





 


Platelet Count


 


 


 403 x10^3/uL


(140-400) 





 


Neutrophils (%) (Auto)   69 % (31-73)  


 


Lymphocytes (%) (Auto)   21 % (24-48)  


 


Monocytes (%) (Auto)   7 % (0-9)  


 


Eosinophils (%) (Auto)   3 % (0-3)  


 


Basophils (%) (Auto)   1 % (0-3)  


 


Neutrophils # (Auto)


 


 


 6.4 x10^3/uL


(1.8-7.7) 





 


Lymphocytes # (Auto)


 


 


 2.0 x10^3/uL


(1.0-4.8) 





 


Monocytes # (Auto)


 


 


 0.7 x10^3/uL


(0.0-1.1) 





 


Eosinophils # (Auto)


 


 


 0.2 x10^3/uL


(0.0-0.7) 





 


Basophils # (Auto)


 


 


 0.1 x10^3/uL


(0.0-0.2) 





 


Sodium Level


 


 


 138 mmol/L


(136-145) 





 


Potassium Level


 


 


 4.5 mmol/L


(3.5-5.1) 





 


Chloride Level


 


 


 105 mmol/L


() 





 


Carbon Dioxide Level


 


 


 26 mmol/L


(21-32) 





 


Anion Gap   7 (6-14)  


 


Blood Urea Nitrogen


 


 


 24 mg/dL


(7-20) 





 


Creatinine


 


 


 1.1 mg/dL


(0.6-1.0) 





 


Estimated GFR


(Cockcroft-Gault) 


 


 52.0 


 





 


Glucose Level


 


 


 130 mg/dL


(70-99) 





 


Calcium Level


 


 


 8.9 mg/dL


(8.5-10.1) 





 


Test


 1/13/20


11:29 


 


 





 


Glucose (Fingerstick)


 144 mg/dL


(70-99) 


 


 











Laboratory Tests








Test


 1/12/20


16:51 1/12/20


20:25 1/13/20


04:50 1/13/20


07:11


 


Glucose (Fingerstick)


 162 mg/dL


(70-99) 202 mg/dL


(70-99) 


 139 mg/dL


(70-99)


 


White Blood Count


 


 


 9.4 x10^3/uL


(4.0-11.0) 





 


Red Blood Count


 


 


 3.49 x10^6/uL


(3.50-5.40) 





 


Hemoglobin


 


 


 10.1 g/dL


(12.0-15.5) 





 


Hematocrit


 


 


 30.9 %


(36.0-47.0) 





 


Mean Corpuscular Volume   88 fL ()  


 


Mean Corpuscular Hemoglobin   29 pg (25-35)  


 


Mean Corpuscular Hemoglobin


Concent 


 


 33 g/dL


(31-37) 





 


Red Cell Distribution Width


 


 


 15.9 %


(11.5-14.5) 





 


Platelet Count


 


 


 403 x10^3/uL


(140-400) 





 


Neutrophils (%) (Auto)   69 % (31-73)  


 


Lymphocytes (%) (Auto)   21 % (24-48)  


 


Monocytes (%) (Auto)   7 % (0-9)  


 


Eosinophils (%) (Auto)   3 % (0-3)  


 


Basophils (%) (Auto)   1 % (0-3)  


 


Neutrophils # (Auto)


 


 


 6.4 x10^3/uL


(1.8-7.7) 





 


Lymphocytes # (Auto)


 


 


 2.0 x10^3/uL


(1.0-4.8) 





 


Monocytes # (Auto)


 


 


 0.7 x10^3/uL


(0.0-1.1) 





 


Eosinophils # (Auto)


 


 


 0.2 x10^3/uL


(0.0-0.7) 





 


Basophils # (Auto)


 


 


 0.1 x10^3/uL


(0.0-0.2) 





 


Sodium Level


 


 


 138 mmol/L


(136-145) 





 


Potassium Level


 


 


 4.5 mmol/L


(3.5-5.1) 





 


Chloride Level


 


 


 105 mmol/L


() 





 


Carbon Dioxide Level


 


 


 26 mmol/L


(21-32) 





 


Anion Gap   7 (6-14)  


 


Blood Urea Nitrogen


 


 


 24 mg/dL


(7-20) 





 


Creatinine


 


 


 1.1 mg/dL


(0.6-1.0) 





 


Estimated GFR


(Cockcroft-Gault) 


 


 52.0 


 





 


Glucose Level


 


 


 130 mg/dL


(70-99) 





 


Calcium Level


 


 


 8.9 mg/dL


(8.5-10.1) 





 


Test


 1/13/20


11:29 


 


 





 


Glucose (Fingerstick)


 144 mg/dL


(70-99) 


 


 











Brief Hospital Course


Ms. Watson  is a 54 old morbidly obese female with good BS< claims she 

follows with our wound care as OP. SHe was admitted for cellultis RT leg and 

staye dhere 7 days com managed with ID, 1/4 bottles GPC - likely contaminant, 

Afebrile non toxic appearing, normal WBC< OK for home today, PO zyvox, diflucan,

augmentin - dw DR Funes, A


WAIt for wound care to see her before dc to give her instructions


I personally examined the wound myself today





COnsults: ID





Discharge Information


Condition at Discharge:  Improved, Stable


Disposition/Orders:  D/C to Home


Scheduled


Albuterol Sulfate (Ventolin Hfa Inhaler) 18 Gm Hfa.aer.ad, 2 PUFF INH Q4HRS for 

FOR ASTHMA, Ref 0 (Reported)


   Entered as Reported by: BRANDON PACHECO on 12/23/19 0952


   Last Action: Converted on 1/8/20 0808 by AIME DIAZ MD


Amlodipine Besylate (Amlodipine Besylate) 5 Mg Tablet, 5 MG PO DAILY for  , 

(Reported)


   Entered as Reported by: BRANDON PACHECO on 12/23/19 0952


   Last Action: Continued on 1/8/20 0808 by AIME DIAZ MD


Amoxicillin/Potassium Clav (Amox Tr-K Clv 875-125 Mg Tab) 1 Each Tablet, 1 TAB 

PO BID for cellulitis, rt leg for 14 Days, #28


   Prescribed by: KEYON DE LA GARZA on 1/13/20 0817


Bupropion Hcl (Wellbutrin Xl) 300 Mg Tab.er.24h, 1 TAB PO DAILY, #90 Ref 3 

(Reported)


   Entered as Reported by: Viviane Reddy on 6/21/16 1319


   Last Action: Converted on 1/8/20 0809 by AIME DIAZ MD


Celecoxib (Celebrex) 100 Mg Capsule, 100 MG PO BID for   for 30 Days, Ref 0 

(Reported)


   Entered as Reported by: BRANDON PACHECO on 12/23/19 0952


Fenofibrate (Fenofibrate) 160 Mg Tablet, 160 MG PO DAILY for chol, (Reported)


   Entered as Reported by: BRANDON PACHECO on 12/23/19 0952


Fluticasone Propionate (Flonase Allergy Relief) 9.9 Ml [place].susp, 2 SPRAYS NS 

DAILY, (Reported)


   Entered as Reported by: Viviane Reddy on 6/21/16 1319


Furosemide (Lasix) 20 Mg Tablet, 2 TAB PO DAILY for  , #90 Ref 1 (Reported)


   may take additional tabs 


   Entered as Reported by: Viviane Reddy on 6/21/16 1319


Gabapentin (Gabapentin) 300 Mg Capsule, 900 MG PO TID for NEUROGENIC PAIN, 

(Reported)


   Entered as Reported by: BRANDON PACHECO on 12/23/19 0952


   Last Action: Continued on 1/8/20 0808 by AIME DIAZ MD


Hyoscyamine Sulfate (Levsin) 0.125 Mg Tablet, 1 TAB PO Q4HRS, #120 Ref 5 

(Reported)


   Entered as Reported by: Viviane Reddy on 6/21/16 1319


Linezolid (Zyvox) 600 Mg Tablet, 600 MG PO BID for vellulitis, #14


   Prescribed by: KEYON DE LA GARZA on 1/13/20 0817


Lisinopril (Lisinopril) 40 Mg Tablet, 40 MG PO DAILY for FOR HYPERTENSION, #30 

Ref 0 (Reported)


   Entered as Reported by: BRANDON PACHECO on 12/23/19 0952


   Last Action: Continued on 1/8/20 0808 by AIME DIAZ MD


Loratadine (Loratadine) 10 Mg Tablet, 1 TAB PO DAILY, #30 Ref 5 (Reported)


   Entered as Reported by: Viviane Reddy on 6/21/16 1319


Metformin Hcl (Metformin Hcl) 1,000 Mg Tablet, 1,000 MG PO BID for dm, Ref 0 

(Reported)


   resume Wednesday PM escamilla 12/25 


   Entered as Reported by: Viviane Reddy on 6/21/16 1319


Methocarbamol (Robaxin-750) 750 Mg Tablet, 1 TAB PO TID, #90 (Reported)


   Entered as Reported by: Viviane Reddy on 6/21/16 1319


   Last Action: Continued on 1/8/20 0808 by AIME DIAZ MD


Montelukast Sodium (Montelukast Sodium Tablet  **) 10 Mg Tablet, 1 TAB PO DAILY,

#30 Ref 5 (Reported)


   Entered as Reported by: Viviane Reddy on 6/21/16 1319


   Last Action: Continued on 1/8/20 0808 by AIME DIAZ MD


Nortriptyline Hcl (Nortriptyline Hcl) 25 Mg Capsule, 1 CAP PO QHS for  , #30 

(Reported)


   Entered as Reported by: BRANDON PACHECO on 12/23/19 0952


   Last Action: Continued on 1/8/20 0808 by AIME DIAZ MD


Nph, Human Insulin Isophane (Humulin N Kwikpen) 100 Unit/1 Ml Insuln.pen, 34 

UNIT SQ DAILYWBKFT for dm, (Reported)


   Entered as Reported by: BRANDON PACHECO on 12/23/19 0952


   Last Action: Converted on 1/8/20 0809 by AIME DIAZ MD


Nph, Human Insulin Isophane (Humulin N Kwikpen) 100 Unit/1 Ml Insuln.pen, 32 

UNIT SQ HS for dm, (Reported)


   Entered as Reported by: BRANDON PACHECO on 12/23/19 0952


   Last Action: Converted on 1/8/20 0809 by AIME DIAZ MD


Ondansetron Hcl (Zofran) 4 Mg Tablet, 1 TAB PO Q6HRS, #20 (Reported)


   Entered as Reported by: Viviane Reddy on 6/21/16 1319


Ranitidine Hcl (Ranitidine Hcl) 150 Mg Tablet, 2 TAB PO BID for  , #180 Ref 3 

(Reported)


   Entered as Reported by: Viviane Reddy on 6/21/16 1319


[Fluconazole] 100 MG TABLET, 200 MG PO DAILY for celultyis, tinea for 14 Days, 

#28


   Prescribed by: KEYON DE LA GARZA on 1/13/20 0817





Scheduled PRN


Hydrocodone Bit/Acetaminophen (Hydrocodone-Apap   **) 1 Each Tablet, 1 TAB

PO PRN Q6HRS PRN for PAIN, #20 Ref 0


   Prescribed by: KEYON DE LA GARZA on 1/13/20 0817











KEYON DE LA GARZA MD           Jan 13, 2020 11:44

## 2020-01-13 NOTE — PDOC
Infectious Disease Note


Subjective


Subjective


Doing alright 


+ drainage and swelling right lower leg, some better 


No F/C/S/N/V/D





ROS


ROS


per HPI





Vital Sign


Vital Signs





Vital Signs








  Date Time  Temp Pulse Resp B/P (MAP) Pulse Ox O2 Delivery O2 Flow Rate FiO2


 


1/12/20 07:36   20  96 Room Air 1.0 


 


1/12/20 07:00 97.8 101  150/86 (107)    





 97.8       











Physical Exam


PHYSICAL EXAM


GENERAL:  Propped up in bed, alert in NAD 


HEENT:  Pupils are equally round.  Oropharynx is pink and moist.  No thrush.


NECK:  Supple.


LUNGS:  Clear.


HEART:  S1, S2.


ABDOMEN:  Obese, soft, and nontender.  Bowel sounds present.


EXTREMITIES:  Right lower extremity 1+ edema.  Mild redness up to knee area. 


Several blisters draining, lower calf area.  All other extremities unremarkable.


SKIN:  Warm to touch without signs of rash.


NEUROLOGIC:  Alert and answers questions appropriately.





Labs


Lab





Laboratory Tests








Test


 1/11/20


11:34 1/11/20


17:13 1/11/20


20:36 1/12/20


07:42


 


Glucose (Fingerstick)


 155 mg/dL


(70-99) 179 mg/dL


(70-99) 164 mg/dL


(70-99) 127 mg/dL


(70-99)








Micro





Microbiology


1/8/20 Blood Culture - Preliminary, Resulted


         NO GROWTH AFTER 4 DAYS








1/7.  BLOOD CULTURE  Final  





        LARGE GRAM POSITIVE RODS,


        IN 1 OF 5 BOTTLES, (ANAEROBIC BOTTLE)


        THREE SETS DRAWN.





     BLD CULT RESULT 1  Preliminary  


        Comment





        Bacillus species, not Bacillus anthracis


        Performed at:  [facility]





Objective


Assessment


Cellulitis right lower extremity


Leukocytosis


Bacteremia lg GPR (1 of 5bottles), likely contaminate  Bacillus species, 


Diabetes 


Peripheral neuropathy


Tobaccoism 


Morbid obesity





Plan


Plan of Care


Continue po zyvox, augmentin and diflucan


Probiotics 


leg elevation


wt loss


Attending Co-Sign


The patient was seen and interviewed as well as examined at the bedside. The 

chart was reviewed. The case was discussed. Agree with the plan of care.











JAIR VALENCIA        Jan 12, 2020 10:30


HASEEB MENCHACA MD               Jan 12, 2020 11:03
Infectious Disease Note


Subjective:


Subjective


Doing alright 


still feels weak and dizzy


+ drainage and swelling right lower leg, some better 


No F/C/S/N/V/D





Vital Signs:


Vital Signs





Vital Signs








  Date Time  Temp Pulse Resp B/P (MAP) Pulse Ox O2 Delivery O2 Flow Rate FiO2


 


1/13/20 08:41  102  149/84    


 


1/13/20 07:58      Room Air  


 


1/13/20 07:00 97.9  19  95  1.0 





 97.9       











Physical Exam:


PHYSICAL EXAM


GENERAL:  Propped up in bed, alert in NAD 


HEENT:  Pupils are equally round.  Oropharynx is pink and moist.  No thrush.


NECK:  Supple.


LUNGS:  Clear.


HEART:  S1, S2.


ABDOMEN:  Obese, soft, and nontender.  Bowel sounds present.


EXTREMITIES:  Right lower extremity 1+ edema.  Mild redness up to knee area. 


Several blisters draining, lower calf area.  All other extremities unremarkable.


SKIN:  Warm to touch without signs of rash.


NEUROLOGIC:  Alert and answers questions appropriately.





Medications:


Inpatient Meds:





Current Medications








 Medications


  (Trade)  Dose


 Ordered  Sig/Ashley  Start Time


 Stop Time Status Last Admin


Dose Admin


 


 Acetaminophen/


 Hydrocodone Bitart


  (Lortab 10/325)  1 tab  PRN Q6HRS  PRN  1/8/20 08:15


 1/8/20 16:09 DC 1/8/20 08:38


1 TAB


 


 Acetaminophen/


 Hydrocodone Bitart


  (Lortab 7.5/325)  2 tab  PRN Q4HRS  PRN  1/9/20 11:15


    1/13/20 06:00


2 TAB


 


 Albuterol Sulfate


  (Ventolin Neb


 Soln)  2.5 mg  PRN QID  PRN  1/11/20 10:00


    1/11/20 16:52


2.5 MG


 


 Amlodipine


 Besylate


  (Norvasc)  5 mg  DAILY  1/8/20 09:00


    1/13/20 08:40


5 MG


 


 Amoxicillin/


 Clavulanate


 Potassium


  (Augmentin 875/


 125mg)  1 tab  BID  1/11/20 12:00


    1/13/20 08:38


1 TAB


 


 Bupropion HCl


  (Wellbutrin Xl)  300 mg  DAILY  1/8/20 09:00


    1/13/20 08:39


300 MG


 


 Cetirizine HCl


  (ZyrTEC)  10 mg  DAILY  1/8/20 09:00


    1/13/20 08:40


10 MG


 


 Dextrose


  (Dextrose


 50%-Water Syringe)  12.5 gm  PRN Q15MIN  PRN  1/8/20 14:45


     





 


 Dextrose


  (Iv Dextrose 5%)  250 ml  PRN Q15MIN  PRN  1/8/20 14:45


     





 


 Enoxaparin Sodium


  (Lovenox 60mg


 Syringe)  60 mg  Q12HR  1/8/20 21:00


    1/13/20 08:41


60 MG


 


 Fluconazole


  (Diflucan)  200 mg  DAILY  1/11/20 12:00


    1/13/20 08:39


200 MG


 


 Gabapentin


  (Neurontin)  900 mg  TID  1/8/20 09:00


    1/13/20 08:39


900 MG


 


 Hydromorphone HCl


  (Dilaudid)  0.5 mg  PRN Q4HRS  PRN  1/9/20 11:15


     





 


 Insulin Glargine


  (Lantus Syringe)  34 unit  DAILYWBKFT  1/8/20 08:30


    1/13/20 08:50


34 UNIT


 


 Insulin Human


 Lispro


  (HumaLOG)  0-9 UNITS  TIDACHC  1/8/20 16:30


    1/12/20 21:25


3 UNITS


 


 Ketorolac


 Tromethamine


  (Toradol 30mg


 Vial)  30 mg  PRN Q6HRS  PRN  1/8/20 15:30


 1/13/20 15:29  1/12/20 18:13


30 MG


 


 Lactobacillus


 Rhamnosus


  (Culturelle)  1 cap  BID  1/8/20 21:00


    1/13/20 08:39


1 CAP


 


 Linezolid


  (Zyvox)  600 mg  BID  1/11/20 12:00


    1/13/20 08:39


600 MG


 


 Lisinopril


  (Prinivil)  40 mg  DAILY  1/8/20 09:00


    1/13/20 08:41


40 MG


 


 Methocarbamol


  (Robaxin)  750 mg  PRN TID  PRN  1/8/20 08:15


    1/9/20 08:15


750 MG


 


 Montelukast Sodium


  (Singulair)  10 mg  DAILY  1/8/20 09:00


    1/13/20 08:39


10 MG


 


 Morphine Sulfate


  (Morphine


 Sulfate)  4 mg  PRN Q4HRS  PRN  1/8/20 04:45


 1/8/20 18:38 DC 1/8/20 14:47


4 MG


 


 Non-Formulary


 Medication


  (Albuterol


 Sulfate (Ventolin


 Hfa Inhaler))  2 puff  Q4HRS  1/8/20 12:00


   UNV  





 


 Nortriptyline HCl


  (Pamelor)  25 mg  QHS  1/8/20 21:00


    1/12/20 21:15


25 MG


 


 Ondansetron HCl


  (Zofran)  4 mg  1X  ONCE  1/8/20 00:30


 1/8/20 00:31 DC 1/8/20 00:19


4 MG


 


 Piperacillin Sod/


 Tazobactam Sod


 4.5 gm/Sodium


 Chloride  100 ml @ 


 200 mls/hr  1X  ONCE  1/8/20 00:00


 1/8/20 00:29 DC 1/8/20 00:18


200 MLS/HR


 


 Sodium Chloride  1,000 ml @ 


 1,000 mls/hr  Q1H  1/8/20 00:00


 1/8/20 01:29 DC 1/8/20 01:00


1,000 MLS/HR


 


 Topiramate


  (Topamax)  25 mg  BID  1/12/20 11:00


    1/13/20 08:39


25 MG


 


 Vancomycin HCl


  (Vanco Per


 Pharmacy)  1 each  PRN DAILY  PRN  1/7/20 23:45


 1/11/20 11:48 DC 1/11/20 08:40


1 EACH


 


 Vancomycin HCl


  (Vancomycin


 Trough Level)  1 each  1X  ONCE  1/9/20 11:30


 1/9/20 11:31 DC  





 


 Vancomycin HCl 2


 gm/Sodium Chloride  500 ml @ 


 250 mls/hr  Q12H  1/8/20 12:00


 1/11/20 11:48 DC 1/10/20 23:51


250 MLS/HR











Labs:


Lab





Laboratory Tests








Test


 1/12/20


11:32 1/12/20


16:51 1/12/20


20:25 1/13/20


04:50


 


Glucose (Fingerstick)


 172 mg/dL


(70-99) 162 mg/dL


(70-99) 202 mg/dL


(70-99) 





 


White Blood Count


 


 


 


 9.4 x10^3/uL


(4.0-11.0)


 


Red Blood Count


 


 


 


 3.49 x10^6/uL


(3.50-5.40)


 


Hemoglobin


 


 


 


 10.1 g/dL


(12.0-15.5)


 


Hematocrit


 


 


 


 30.9 %


(36.0-47.0)


 


Mean Corpuscular Volume    88 fL () 


 


Mean Corpuscular Hemoglobin    29 pg (25-35) 


 


Mean Corpuscular Hemoglobin


Concent 


 


 


 33 g/dL


(31-37)


 


Red Cell Distribution Width


 


 


 


 15.9 %


(11.5-14.5)


 


Platelet Count


 


 


 


 403 x10^3/uL


(140-400)


 


Neutrophils (%) (Auto)    69 % (31-73) 


 


Lymphocytes (%) (Auto)    21 % (24-48) 


 


Monocytes (%) (Auto)    7 % (0-9) 


 


Eosinophils (%) (Auto)    3 % (0-3) 


 


Basophils (%) (Auto)    1 % (0-3) 


 


Neutrophils # (Auto)


 


 


 


 6.4 x10^3/uL


(1.8-7.7)


 


Lymphocytes # (Auto)


 


 


 


 2.0 x10^3/uL


(1.0-4.8)


 


Monocytes # (Auto)


 


 


 


 0.7 x10^3/uL


(0.0-1.1)


 


Eosinophils # (Auto)


 


 


 


 0.2 x10^3/uL


(0.0-0.7)


 


Basophils # (Auto)


 


 


 


 0.1 x10^3/uL


(0.0-0.2)


 


Sodium Level


 


 


 


 138 mmol/L


(136-145)


 


Potassium Level


 


 


 


 4.5 mmol/L


(3.5-5.1)


 


Chloride Level


 


 


 


 105 mmol/L


()


 


Carbon Dioxide Level


 


 


 


 26 mmol/L


(21-32)


 


Anion Gap    7 (6-14) 


 


Blood Urea Nitrogen


 


 


 


 24 mg/dL


(7-20)


 


Creatinine


 


 


 


 1.1 mg/dL


(0.6-1.0)


 


Estimated GFR


(Cockcroft-Gault) 


 


 


 52.0 





 


Glucose Level


 


 


 


 130 mg/dL


(70-99)


 


Calcium Level


 


 


 


 8.9 mg/dL


(8.5-10.1)


 


Test


 1/13/20


07:11 


 


 





 


Glucose (Fingerstick)


 139 mg/dL


(70-99) 


 


 














Objective:


Assessment:


Cellulitis right lower extremity improving slowly


Leukocytosis


Bacteremia lg GPR (1 of 5 bottles), likely contaminate  Bacillus species, 


Diabetes 


Peripheral neuropathy


Tobaccoism 


Morbid obesity





Plan:


Plan of Care


Continue po zyvox, augmentin and diflucan


Probiotics 


leg elevation


wt loss











KENNA MENCHACA MD           Jan 13, 2020 09:48
Infectious Disease Note


Vital Sign


Vital Signs





Vital Signs








  Date Time  Temp Pulse Resp B/P (MAP) Pulse Ox O2 Delivery O2 Flow Rate FiO2


 


1/11/20 09:47      Room Air  


 


1/11/20 08:47  107  131/70    


 


1/11/20 08:11     96  1.0 


 


1/11/20 07:00 98.2  20     





 98.2       











Labs


Lab





Laboratory Tests








Test


 1/10/20


11:10 1/10/20


18:05 1/10/20


20:38 1/11/20


05:15


 


Glucose (Fingerstick)


 178 mg/dL


(70-99) 224 mg/dL


(70-99) 155 mg/dL


(70-99) 





 


Creatinine


 


 


 


 1.1 mg/dL


(0.6-1.0)


 


Estimated GFR


(Cockcroft-Gault) 


 


 


 52.0 





 


Test


 1/11/20


08:38 


 


 





 


Glucose (Fingerstick)


 171 mg/dL


(70-99) 


 


 











Micro





Microbiology


1/8/20 Blood Culture - Preliminary, Resulted


         NO GROWTH AFTER 3 DAYS








1/7.  BLOOD CULTURE  Final  





        LARGE GRAM POSITIVE RODS,


        IN 1 OF 5 BOTTLES, (ANAEROBIC BOTTLE)


        THREE SETS DRAWN.





Objective


Assessment


Cellulitis right lower extremity


Leukocytosis


Bacteremia lg GPR (1 of 5bottles), likely contaminate


Diabetes 


Peripheral neuropathy


Tobaccoism 


Morbid obesity





Plan


Plan of Care


change antibiotics to po zyvox and augmentin, add diflucan


leg elevation


wt loss





thank you











JAIR VALENCIA        Jan 11, 2020 11:14


HASEEB MENCHACA MD               Jan 11, 2020 11:50
PROGRESS NOTES


Chief Complaint


Chief Complaint


A/P:


Cellulitis of right lower extremity - will start on empiric antibiotics to cover

strep and staph. Has risk for polymicrobial infection with diabetic and wound h

istory


Sepsis - will cont IVF and antibiotics


DM2 - basal bolus plus insulin. A1c was 10.1 last check


HTN - cont meds


HLD - cont statin/fibrate


Depression - cont wellbutrin


Chronic pain - will cont meds. On permanent disability for her back


Acute hypoxic respiratory failure - not on home O2, no formal COPD diagnosis 

that she knows of. Is a smoker, relapsed recently. Will obtain CXR


Smoker - counseled on cessation





FEN - ADA


PPX - Lovenox


FULL CODE


Dispo - inpatient for RLE cellulitis failing outpatient treatment





History of Present Illness


History of Present Illness


Ms Gutierrez 54-year-old female w/ PMHx DM2, PCOS, Depression, GERD, chronic pain on

permanent disability, smoker, RLE venous stasis who p/w lower extremity 

infection. Patient has as an outpatient and being treated for lower extremity 

wounds however she's noticed increasing erythema and pain to her right lower 

extremity. She did have a procedure done by Dr. Montemayor with angiography on 

December 23, 2018 which was negative for vascular occlusion.  Patient states the

wound care has been ongoing for 8 months.  She describes chills, pain to RLE, 

open wounds with drainage.  States she has had vascular imaging to eval arterial

blood flow which was negative and venous doppler negative for DVT.  Patient does

complain of nausea on exam


WBC 13.1, HR 120s, admitted for cellulitis failing outpatient treatment at wound

center.





Having more pain in leg today. Her hydrocodone was reduced overnight on call. 

Leg improved slightly. CXR with no infiltrate. No CP or SOB. Her abdominal pain 

is improved.





Vitals


Vitals





Vital Signs








  Date Time  Temp Pulse Resp B/P (MAP) Pulse Ox O2 Delivery O2 Flow Rate FiO2


 


1/9/20 10:54   20   Nasal Cannula 1.0 


 


1/9/20 10:54     95   


 


1/9/20 08:20  115  134/68    


 


1/9/20 07:00 98.5       





 98.5       











Physical Exam


General:  Alert, Oriented X3, Cooperative, No acute distress


Abdomen:  Normal bowel sounds, Soft, No tenderness, No hepatosplenomegaly, No 

masses


Extremities:  No clubbing, No cyanosis, Normal pulses, Other (RLE tender and 

swollen to thigh)


Skin:  No breakdown, Other (RLE with rash up to thigh, red, hot, swollen)





Labs


LABS





Laboratory Tests








Test


 1/8/20


11:47 1/8/20


16:37 1/8/20


20:34 1/9/20


07:38


 


Glucose (Fingerstick)


 179 mg/dL


(70-99) 184 mg/dL


(70-99) 211 mg/dL


(70-99) 139 mg/dL


(70-99)











Assessment and Plan


Assessmemt and Plan


Problems


Medical Problems:


(1) Cellulitis of right lower extremity


Status: Acute  





(2) Sepsis


Status: Acute  











Comment


Review of Relevant


I have reviewed the following items daniel (where applicable) has been applied.


Labs





Laboratory Tests








Test


 1/7/20


23:30 1/8/20


07:22 1/8/20


11:47 1/8/20


16:37


 


White Blood Count


 13.1 x10^3/uL


(4.0-11.0) 


 


 





 


Red Blood Count


 4.28 x10^6/uL


(3.50-5.40) 


 


 





 


Hemoglobin


 12.3 g/dL


(12.0-15.5) 


 


 





 


Hematocrit


 37.5 %


(36.0-47.0) 


 


 





 


Mean Corpuscular Volume 88 fL ()    


 


Mean Corpuscular Hemoglobin 29 pg (25-35)    


 


Mean Corpuscular Hemoglobin


Concent 33 g/dL


(31-37) 


 


 





 


Red Cell Distribution Width


 15.7 %


(11.5-14.5) 


 


 





 


Platelet Count


 315 x10^3/uL


(140-400) 


 


 





 


Neutrophils (%) (Auto) 87 % (31-73)    


 


Lymphocytes (%) (Auto) 8 % (24-48)    


 


Monocytes (%) (Auto) 5 % (0-9)    


 


Eosinophils (%) (Auto) 0 % (0-3)    


 


Basophils (%) (Auto) 1 % (0-3)    


 


Neutrophils # (Auto)


 11.3 x10^3/uL


(1.8-7.7) 


 


 





 


Lymphocytes # (Auto)


 1.0 x10^3/uL


(1.0-4.8) 


 


 





 


Monocytes # (Auto)


 0.6 x10^3/uL


(0.0-1.1) 


 


 





 


Eosinophils # (Auto)


 0.0 x10^3/uL


(0.0-0.7) 


 


 





 


Basophils # (Auto)


 0.1 x10^3/uL


(0.0-0.2) 


 


 





 


Segmented Neutrophils % 74 % (35-66)    


 


Band Neutrophils % 4 % (0-9)    


 


Lymphocytes % 15 % (24-48)    


 


Monocytes % 7 % (0-10)    


 


Toxic Granulation Slight    


 


Platelet Estimate


 Adequate


(ADEQUATE) 


 


 





 


D-Dimer (Flavia)


 1.09 ug/mlFEU


(0.00-0.50) 


 


 





 


Sodium Level


 136 mmol/L


(136-145) 


 


 





 


Potassium Level


 4.1 mmol/L


(3.5-5.1) 


 


 





 


Chloride Level


 100 mmol/L


() 


 


 





 


Carbon Dioxide Level


 27 mmol/L


(21-32) 


 


 





 


Anion Gap 9 (6-14)    


 


Blood Urea Nitrogen


 17 mg/dL


(7-20) 


 


 





 


Creatinine


 1.1 mg/dL


(0.6-1.0) 


 


 





 


Estimated GFR


(Cockcroft-Gault) 52.0 


 


 


 





 


BUN/Creatinine Ratio 15 (6-20)    


 


Glucose Level


 197 mg/dL


(70-99) 


 


 





 


Lactic Acid Level


 1.6 mmol/L


(0.4-2.0) 


 


 





 


Calcium Level


 9.2 mg/dL


(8.5-10.1) 


 


 





 


Total Bilirubin


 0.3 mg/dL


(0.2-1.0) 


 


 





 


Aspartate Amino Transf


(AST/SGOT) 19 U/L (15-37) 


 


 


 





 


Alanine Aminotransferase


(ALT/SGPT) 19 U/L (14-59) 


 


 


 





 


Alkaline Phosphatase


 69 U/L


() 


 


 





 


Creatine Kinase


 193 U/L


() 


 


 





 


Total Protein


 7.5 g/dL


(6.4-8.2) 


 


 





 


Albumin


 3.3 g/dL


(3.4-5.0) 


 


 





 


Albumin/Globulin Ratio 0.8 (1.0-1.7)    


 


Procalcitonin


 0.63 ng/mL


(0.00-0.10) 


 


 





 


Glucose (Fingerstick)


 


 194 mg/dL


(70-99) 179 mg/dL


(70-99) 184 mg/dL


(70-99)


 


Test


 1/8/20


20:34 1/9/20


07:38 


 





 


Glucose (Fingerstick)


 211 mg/dL


(70-99) 139 mg/dL


(70-99) 


 











Laboratory Tests








Test


 1/8/20


11:47 1/8/20


16:37 1/8/20


20:34 1/9/20


07:38


 


Glucose (Fingerstick)


 179 mg/dL


(70-99) 184 mg/dL


(70-99) 211 mg/dL


(70-99) 139 mg/dL


(70-99)








Microbiology


1/8/20 Blood Culture - Preliminary, Resulted


         NO GROWTH AFTER 1 DAY


Medications





Current Medications


Piperacillin Sod/ Tazobactam Sod 4.5 gm/Sodium Chloride 100 ml @  200 mls/hr 1X 

ONCE IV  Last administered on 1/8/20at 00:18;  Start 1/8/20 at 00:00;  Stop 

1/8/20 at 00:29;  Status DC


Vancomycin HCl (Vanco Per Pharmacy) 1 each PRN DAILY  PRN MC SEE COMMENTS Last 

administered on 1/8/20at 02:23;  Start 1/7/20 at 23:45


Sodium Chloride 1,000 ml @  1,000 mls/hr Q1H IV  Last administered on 1/8/20at 

01:00;  Start 1/8/20 at 00:00;  Stop 1/8/20 at 01:29;  Status DC


Morphine Sulfate (Morphine Sulfate) 4 mg 1X  ONCE IV  Last administered on 

1/8/20at 00:19;  Start 1/8/20 at 00:30;  Stop 1/8/20 at 18:38;  Status DC


Ondansetron HCl (Zofran) 4 mg 1X  ONCE IV  Last administered on 1/8/20at 00:19; 

Start 1/8/20 at 00:30;  Stop 1/8/20 at 00:31;  Status DC


Vancomycin HCl 2 gm/Sodium Chloride 500 ml @  250 mls/hr 1X  ONCE IV  Last 

administered on 1/8/20at 00:22;  Start 1/8/20 at 00:30;  Stop 1/8/20 at 02:29;  

Status DC


Vancomycin HCl 2 gm/Sodium Chloride 500 ml @  250 mls/hr Q12H IV  Last 

administered on 1/9/20at 01:07;  Start 1/8/20 at 12:00


Vancomycin HCl (Vancomycin Trough Level) 1 each 1X  ONCE MC ;  Start 1/9/20 at 

11:30;  Stop 1/9/20 at 11:31


Morphine Sulfate (Morphine Sulfate) 4 mg PRN Q4HRS  PRN IV SEVERE PAIN 7-10 Last

administered on 1/8/20at 14:47;  Start 1/8/20 at 04:45;  Stop 1/8/20 at 18:38;  

Status DC


Acetaminophen/ Hydrocodone Bitart (Lortab 7.5/325) 1 tab PRN Q4HRS  PRN PO 

MODERATE PAIN Last administered on 1/9/20at 08:16;  Start 1/8/20 at 08:15


Amlodipine Besylate (Norvasc) 5 mg DAILY PO  Last administered on 1/9/20at 

08:20;  Start 1/8/20 at 09:00


Gabapentin (Neurontin) 900 mg TID PO  Last administered on 1/9/20at 08:14;  

Start 1/8/20 at 09:00


Acetaminophen/ Hydrocodone Bitart (Lortab 10/325) 1 tab PRN Q6HRS  PRN PO 

MODERATE PAIN Last administered on 1/8/20at 08:38;  Start 1/8/20 at 08:15;  Stop

1/8/20 at 16:09;  Status DC


Lisinopril (Prinivil) 40 mg DAILY PO  Last administered on 1/9/20at 08:15;  

Start 1/8/20 at 09:00


Methocarbamol (Robaxin) 750 mg PRN TID  PRN PO muscle spasms Last administered 

on 1/9/20at 08:15;  Start 1/8/20 at 08:15


Montelukast Sodium (Singulair) 10 mg DAILY PO  Last administered on 1/9/20at 

08:14;  Start 1/8/20 at 09:00


Nortriptyline HCl (Pamelor) 25 mg QHS PO  Last administered on 1/8/20at 20:35;  

Start 1/8/20 at 21:00


Non-Formulary Medication (Albuterol Sulfate (Ventolin Hfa Inhaler)) 2 puff Q4HRS

INH ;  Start 1/8/20 at 12:00;  Status UNV


Bupropion HCl (Wellbutrin Xl) 300 mg DAILY PO  Last administered on 1/9/20at 

08:14;  Start 1/8/20 at 09:00


Insulin Glargine (Lantus Syringe) 32 unit QHS SQ  Last administered on 1/8/20at 

20:50;  Start 1/8/20 at 21:00


Insulin Glargine (Lantus Syringe) 34 unit DAILYWBKFT SQ  Last administered on 

1/9/20at 08:13;  Start 1/8/20 at 08:30


Albuterol Sulfate (Ventolin Neb Soln) 2.5 mg Q4HRS NEB  Last administered on 

1/9/20at 10:54;  Start 1/8/20 at 08:45


Cetirizine HCl (ZyrTEC) 10 mg DAILY PO  Last administered on 1/9/20at 08:15;  

Start 1/8/20 at 09:00


Lactobacillus Rhamnosus (Culturelle) 1 cap BID PO  Last administered on 1/9/20at

08:14;  Start 1/8/20 at 21:00


Insulin Human Lispro (HumaLOG) 0-9 UNITS TIDACHC SQ  Last administered on 

1/8/20at 20:49;  Start 1/8/20 at 16:30


Dextrose (Dextrose 50%-Water Syringe) 12.5 gm PRN Q15MIN  PRN IV SEE COMMENTS;  

Start 1/8/20 at 14:45


Dextrose (Iv Dextrose 5%) 250 ml PRN Q15MIN  PRN IV SEE COMMENTS;  Start 1/8/20 

at 14:45


Enoxaparin Sodium (Lovenox 60mg Syringe) 60 mg Q12HR SQ  Last administered on 

1/9/20at 08:15;  Start 1/8/20 at 21:00


Ketorolac Tromethamine (Toradol 30mg Vial) 30 mg PRN Q6HRS  PRN IVP MODERATE 

PAIN Last administered on 1/9/20at 09:46;  Start 1/8/20 at 15:30;  Stop 1/13/20 

at 15:29


Hydromorphone HCl (Dilaudid) 1 mg PRN Q4HRS  PRN IV SEVERE PAIN Last 

administered on 1/9/20at 10:24;  Start 1/8/20 at 16:15





Active Scripts


Active


Reported


Nortriptyline Hcl 25 Mg Capsule 1 Cap PO QHS


Celebrex (Celecoxib) 100 Mg Capsule 100 Mg PO BID 30 Days


Amlodipine Besylate 5 Mg Tablet 5 Mg PO DAILY


Humulin N Kwikpen (Nph, Human Insulin Isophane) 100 Unit/1 Ml Insuln.pen 32 Unit

SQ HS


Humulin N Kwikpen (Nph, Human Insulin Isophane) 100 Unit/1 Ml Insuln.pen 34 Unit

SQ [id number]


Ventolin Hfa Inhaler (Albuterol Sulfate) 18 Gm Hfa.aer.ad 2 Puff INH Q4HRS


Lisinopril 40 Mg Tablet 40 Mg PO DAILY


Fenofibrate 160 Mg Tablet 160 Mg PO DAILY


Gabapentin 300 Mg Capsule 900 Mg PO TID


Zofran (Ondansetron Hcl) 4 Mg Tablet 1 Tab PO Q6HRS


Levsin (Hyoscyamine Sulfate) 0.125 Mg Tablet 1 Tab PO Q4HRS


Flonase Allergy Relief (Fluticasone Propionate) 9.9 Ml Almo.susp 2 Sprays NS 

DAILY


Robaxin-750 (Methocarbamol) 750 Mg Tablet 1 Tab PO TID


Hydrocodone-Apap   ** (Hydrocodone Bit/Acetaminophen) 1 Each Tablet 1 Tab 

PO PRN Q6HRS PRN


Ranitidine Hcl 150 Mg Tablet 2 Tab PO BID


Wellbutrin Xl (Bupropion Hcl) 300 Mg Tab.er.24h 1 Tab PO DAILY


Lasix (Furosemide) 20 Mg Tablet 2 Tab PO DAILY


     may take additional tabs


Montelukast Sodium Tablet  ** (Montelukast Sodium) 10 Mg Tablet 1 Tab PO DAILY


Loratadine 10 Mg Tablet 1 Tab PO DAILY


Metformin Hcl 1,000 Mg Tablet 1,000 Mg PO BID


     resume Wednesday PM escamilla 12/25


Vitals/I & O





Vital Sign - Last 24 Hours








 1/8/20 1/8/20 1/8/20 1/8/20





 12:25 12:48 13:48 14:47


 


Resp  20 18 28


 


Pulse Ox 99   99


 


O2 Delivery Nasal Cannula Nasal Cannula Room Air Nasal Cannula


 


O2 Flow Rate 2.0   2.0





 1/8/20 1/8/20 1/8/20 1/8/20





 15:00 15:17 16:34 17:26


 


Temp 97.9   





 97.9   


 


Pulse 99   


 


Resp 16 28  22


 


B/P (MAP) 111/49 (69)   


 


Pulse Ox 98  99 


 


O2 Delivery Nasal Cannula Nasal Cannula Nasal Cannula Nasal Cannula


 


O2 Flow Rate 3.0  2.0 2.0


 


    





    





 1/8/20 1/8/20 1/8/20 1/8/20





 17:56 19:00 20:00 20:36


 


Temp  98.9  





  98.9  


 


Pulse  98  


 


Resp 18 16  


 


B/P (MAP)  96/55 (69)  


 


Pulse Ox  97  99


 


O2 Delivery Nasal Cannula Nasal Cannula Nasal Cannula Nasal Cannula


 


O2 Flow Rate  3.0 1.0 2.0


 


    





    





 1/8/20 1/8/20 1/8/20 1/9/20





 21:26 21:36 23:00 03:00


 


Temp   97.6 99.2





   97.6 99.2


 


Pulse   99 117


 


Resp   17 17


 


B/P (MAP)   132/67 (88) 150/66 (94)


 


Pulse Ox 99 99 96 90


 


O2 Delivery Nasal Cannula Nasal Cannula Nasal Cannula Nasal Cannula


 


O2 Flow Rate 2.0 1.0 3.0 3.0


 


    





    





 1/9/20 1/9/20 1/9/20 1/9/20





 03:12 07:00 07:16 08:00


 


Temp  98.5  





  98.5  


 


Pulse  115  


 


Resp  18  


 


B/P (MAP)  134/68 (90)  


 


Pulse Ox 96 93 94 


 


O2 Delivery Nasal Cannula Nasal Cannula Nasal Cannula Nasal Cannula


 


O2 Flow Rate 3.0 1.0 1.0 1.0


 


    





    





 1/9/20 1/9/20 1/9/20 1/9/20





 08:15 08:16 08:20 09:16


 


Pulse 115  115 


 


Resp  18  18


 


B/P (MAP) 134/68  134/68 


 


O2 Delivery  Nasal Cannula  Nasal Cannula


 


O2 Flow Rate  1.0  1.0





 1/9/20 1/9/20 1/9/20 





 10:24 10:54 10:54 


 


Resp 20  20 


 


Pulse Ox  95  


 


O2 Delivery Nasal Cannula Nasal Cannula Nasal Cannula 


 


O2 Flow Rate 1.0 1.0 1.0 














Intake and Output   


 


 1/8/20 1/8/20 1/9/20





 15:00 23:00 07:00


 


Intake Total 350 ml 150 ml 0 ml


 


Balance 350 ml 150 ml 0 ml

















AIME DIAZ MD         Jan 9, 2020 11:15
PROGRESS NOTES


Chief Complaint


Chief Complaint


A/P:


Cellulitis of right lower extremity - will start on empiric antibiotics to cover

strep and staph. Has risk for polymicrobial infection with diabetic and wound h

istory


Sepsis - will cont IVF and antibiotics


DM2 - basal bolus plus insulin. A1c was 10.1 last check


HTN - cont meds


HLD - cont statin/fibrate


Depression - cont wellbutrin


Chronic pain - will cont meds. On permanent disability for her back


Acute hypoxic respiratory failure - not on home O2, no formal COPD diagnosis 

that she knows of. Is a smoker, relapsed recently. Will obtain CXR


Smoker - counseled on cessation





FEN - ADA


PPX - Lovenox


FULL CODE


Dispo - inpatient for RLE cellulitis failing outpatient treatment





History of Present Illness


History of Present Illness


Ms Gutierrez 54-year-old female w/ PMHx DM2, PCOS, Depression, GERD, chronic pain on

permanent disability, smoker, RLE venous stasis who p/w lower extremity 

infection. Patient has as an outpatient and being treated for lower extremity 

wounds however she's noticed increasing erythema and pain to her right lower 

extremity. She did have a procedure done by Dr. Montemayor with angiography on 

December 23, 2018 which was negative for vascular occlusion.  Patient states the

wound care has been ongoing for 8 months.  She describes chills, pain to RLE, 

open wounds with drainage.  States she has had vascular imaging to eval arterial

blood flow which was negative and venous doppler negative for DVT.  Patient does

complain of nausea on exam


WBC 13.1, HR 120s, admitted for cellulitis failing outpatient treatment at wound

center.





Having more pain in leg today. Her hydrocodone was reduced overnight on call. 

Leg improved slightly. CXR with no infiltrate. No CP or SOB. Her abdominal pain 

is improved. She still states she feels fever and chills. No documented fevers. 

She has extreme pain on attempting weight-bearing. ASO titer positive. MRSA 

Nares negative.





Plan:


Will consult ID.





Vitals


Vitals





Vital Signs








  Date Time  Temp Pulse Resp B/P (MAP) Pulse Ox O2 Delivery O2 Flow Rate FiO2


 


1/10/20 07:20     100 Nasal Cannula 1.0 


 


1/10/20 07:00 98.0 107 18 113/68 (83)    





 98.0       











Physical Exam


General:  Alert, Oriented X3, Cooperative, No acute distress


Abdomen:  Normal bowel sounds, Soft, No tenderness, No hepatosplenomegaly, No 

masses


Extremities:  No clubbing, No cyanosis, Normal pulses, Other (RLE tender and 

swollen to thigh)


Skin:  No breakdown, Other (RLE with rash up to thigh, red, hot, swollen)





Labs


LABS





Laboratory Tests








Test


 1/9/20


11:15 1/9/20


11:35 1/9/20


11:36 1/9/20


16:31


 


Vancomycin Level Trough


 20.4 mcg/mL


(10.0-20.0) 


 


 





 


Vancomycin Last Dose Date 1/9/20    


 


Vancomycin Last Dose Time 0000    


 


Anti-Streptolysin O Antibody


 57.0 IU/mL


(0.0-200.0) 


 


 





 


Nasal Screen MRSA (PCR)


 


 Negative


(Negative) 


 





 


Glucose (Fingerstick)


 


 


 185 mg/dL


(70-99) 153 mg/dL


(70-99)


 


Test


 1/9/20


20:59 1/10/20


04:43 1/10/20


07:29 





 


Glucose (Fingerstick)


 169 mg/dL


(70-99) 


 154 mg/dL


(70-99) 





 


White Blood Count


 


 10.8 x10^3/uL


(4.0-11.0) 


 





 


Red Blood Count


 


 3.57 x10^6/uL


(3.50-5.40) 


 





 


Hemoglobin


 


 10.4 g/dL


(12.0-15.5) 


 





 


Hematocrit


 


 31.4 %


(36.0-47.0) 


 





 


Mean Corpuscular Volume  88 fL ()   


 


Mean Corpuscular Hemoglobin  29 pg (25-35)   


 


Mean Corpuscular Hemoglobin


Concent 


 33 g/dL


(31-37) 


 





 


Red Cell Distribution Width


 


 15.6 %


(11.5-14.5) 


 





 


Platelet Count


 


 312 x10^3/uL


(140-400) 


 





 


Neutrophils (%) (Auto)  74 % (31-73)   


 


Lymphocytes (%) (Auto)  16 % (24-48)   


 


Monocytes (%) (Auto)  8 % (0-9)   


 


Eosinophils (%) (Auto)  1 % (0-3)   


 


Basophils (%) (Auto)  1 % (0-3)   


 


Neutrophils # (Auto)


 


 8.0 x10^3/uL


(1.8-7.7) 


 





 


Lymphocytes # (Auto)


 


 1.8 x10^3/uL


(1.0-4.8) 


 





 


Monocytes # (Auto)


 


 0.8 x10^3/uL


(0.0-1.1) 


 





 


Eosinophils # (Auto)


 


 0.1 x10^3/uL


(0.0-0.7) 


 





 


Basophils # (Auto)


 


 0.1 x10^3/uL


(0.0-0.2) 


 





 


Sodium Level


 


 138 mmol/L


(136-145) 


 





 


Potassium Level


 


 4.2 mmol/L


(3.5-5.1) 


 





 


Chloride Level


 


 104 mmol/L


() 


 





 


Carbon Dioxide Level


 


 23 mmol/L


(21-32) 


 





 


Anion Gap  11 (6-14)   


 


Blood Urea Nitrogen


 


 23 mg/dL


(7-20) 


 





 


Creatinine


 


 1.2 mg/dL


(0.6-1.0) 


 





 


Estimated GFR


(Cockcroft-Gault) 


 47.0 


 


 





 


Glucose Level


 


 175 mg/dL


(70-99) 


 





 


Calcium Level


 


 8.2 mg/dL


(8.5-10.1) 


 














Assessment and Plan


Assessmemt and Plan


Problems


Medical Problems:


(1) Cellulitis of right lower extremity


Status: Acute  





(2) Sepsis


Status: Acute  











Comment


Review of Relevant


I have reviewed the following items daniel (where applicable) has been applied.


Labs





Laboratory Tests








Test


 1/8/20


11:47 1/8/20


16:37 1/8/20


20:34 1/9/20


07:38


 


Glucose (Fingerstick)


 179 mg/dL


(70-99) 184 mg/dL


(70-99) 211 mg/dL


(70-99) 139 mg/dL


(70-99)


 


Test


 1/9/20


11:15 1/9/20


11:35 1/9/20


11:36 1/9/20


16:31


 


Vancomycin Level Trough


 20.4 mcg/mL


(10.0-20.0) 


 


 





 


Vancomycin Last Dose Date 1/9/20    


 


Vancomycin Last Dose Time 0000    


 


Anti-Streptolysin O Antibody


 57.0 IU/mL


(0.0-200.0) 


 


 





 


Nasal Screen MRSA (PCR)


 


 Negative


(Negative) 


 





 


Glucose (Fingerstick)


 


 


 185 mg/dL


(70-99) 153 mg/dL


(70-99)


 


Test


 1/9/20


20:59 1/10/20


04:43 1/10/20


07:29 





 


Glucose (Fingerstick)


 169 mg/dL


(70-99) 


 154 mg/dL


(70-99) 





 


White Blood Count


 


 10.8 x10^3/uL


(4.0-11.0) 


 





 


Red Blood Count


 


 3.57 x10^6/uL


(3.50-5.40) 


 





 


Hemoglobin


 


 10.4 g/dL


(12.0-15.5) 


 





 


Hematocrit


 


 31.4 %


(36.0-47.0) 


 





 


Mean Corpuscular Volume  88 fL ()   


 


Mean Corpuscular Hemoglobin  29 pg (25-35)   


 


Mean Corpuscular Hemoglobin


Concent 


 33 g/dL


(31-37) 


 





 


Red Cell Distribution Width


 


 15.6 %


(11.5-14.5) 


 





 


Platelet Count


 


 312 x10^3/uL


(140-400) 


 





 


Neutrophils (%) (Auto)  74 % (31-73)   


 


Lymphocytes (%) (Auto)  16 % (24-48)   


 


Monocytes (%) (Auto)  8 % (0-9)   


 


Eosinophils (%) (Auto)  1 % (0-3)   


 


Basophils (%) (Auto)  1 % (0-3)   


 


Neutrophils # (Auto)


 


 8.0 x10^3/uL


(1.8-7.7) 


 





 


Lymphocytes # (Auto)


 


 1.8 x10^3/uL


(1.0-4.8) 


 





 


Monocytes # (Auto)


 


 0.8 x10^3/uL


(0.0-1.1) 


 





 


Eosinophils # (Auto)


 


 0.1 x10^3/uL


(0.0-0.7) 


 





 


Basophils # (Auto)


 


 0.1 x10^3/uL


(0.0-0.2) 


 





 


Sodium Level


 


 138 mmol/L


(136-145) 


 





 


Potassium Level


 


 4.2 mmol/L


(3.5-5.1) 


 





 


Chloride Level


 


 104 mmol/L


() 


 





 


Carbon Dioxide Level


 


 23 mmol/L


(21-32) 


 





 


Anion Gap  11 (6-14)   


 


Blood Urea Nitrogen


 


 23 mg/dL


(7-20) 


 





 


Creatinine


 


 1.2 mg/dL


(0.6-1.0) 


 





 


Estimated GFR


(Cockcroft-Gault) 


 47.0 


 


 





 


Glucose Level


 


 175 mg/dL


(70-99) 


 





 


Calcium Level


 


 8.2 mg/dL


(8.5-10.1) 


 











Laboratory Tests








Test


 1/9/20


11:15 1/9/20


11:35 1/9/20


11:36 1/9/20


16:31


 


Vancomycin Level Trough


 20.4 mcg/mL


(10.0-20.0) 


 


 





 


Vancomycin Last Dose Date 1/9/20    


 


Vancomycin Last Dose Time 0000    


 


Anti-Streptolysin O Antibody


 57.0 IU/mL


(0.0-200.0) 


 


 





 


Nasal Screen MRSA (PCR)


 


 Negative


(Negative) 


 





 


Glucose (Fingerstick)


 


 


 185 mg/dL


(70-99) 153 mg/dL


(70-99)


 


Test


 1/9/20


20:59 1/10/20


04:43 1/10/20


07:29 





 


Glucose (Fingerstick)


 169 mg/dL


(70-99) 


 154 mg/dL


(70-99) 





 


White Blood Count


 


 10.8 x10^3/uL


(4.0-11.0) 


 





 


Red Blood Count


 


 3.57 x10^6/uL


(3.50-5.40) 


 





 


Hemoglobin


 


 10.4 g/dL


(12.0-15.5) 


 





 


Hematocrit


 


 31.4 %


(36.0-47.0) 


 





 


Mean Corpuscular Volume  88 fL ()   


 


Mean Corpuscular Hemoglobin  29 pg (25-35)   


 


Mean Corpuscular Hemoglobin


Concent 


 33 g/dL


(31-37) 


 





 


Red Cell Distribution Width


 


 15.6 %


(11.5-14.5) 


 





 


Platelet Count


 


 312 x10^3/uL


(140-400) 


 





 


Neutrophils (%) (Auto)  74 % (31-73)   


 


Lymphocytes (%) (Auto)  16 % (24-48)   


 


Monocytes (%) (Auto)  8 % (0-9)   


 


Eosinophils (%) (Auto)  1 % (0-3)   


 


Basophils (%) (Auto)  1 % (0-3)   


 


Neutrophils # (Auto)


 


 8.0 x10^3/uL


(1.8-7.7) 


 





 


Lymphocytes # (Auto)


 


 1.8 x10^3/uL


(1.0-4.8) 


 





 


Monocytes # (Auto)


 


 0.8 x10^3/uL


(0.0-1.1) 


 





 


Eosinophils # (Auto)


 


 0.1 x10^3/uL


(0.0-0.7) 


 





 


Basophils # (Auto)


 


 0.1 x10^3/uL


(0.0-0.2) 


 





 


Sodium Level


 


 138 mmol/L


(136-145) 


 





 


Potassium Level


 


 4.2 mmol/L


(3.5-5.1) 


 





 


Chloride Level


 


 104 mmol/L


() 


 





 


Carbon Dioxide Level


 


 23 mmol/L


(21-32) 


 





 


Anion Gap  11 (6-14)   


 


Blood Urea Nitrogen


 


 23 mg/dL


(7-20) 


 





 


Creatinine


 


 1.2 mg/dL


(0.6-1.0) 


 





 


Estimated GFR


(Cockcroft-Gault) 


 47.0 


 


 





 


Glucose Level


 


 175 mg/dL


(70-99) 


 





 


Calcium Level


 


 8.2 mg/dL


(8.5-10.1) 


 











Microbiology


1/8/20 Blood Culture - Preliminary, Resulted


         NO GROWTH AFTER 1 DAY


Medications





Current Medications


Piperacillin Sod/ Tazobactam Sod 4.5 gm/Sodium Chloride 100 ml @  200 mls/hr 1X 

ONCE IV  Last administered on 1/8/20at 00:18;  Start 1/8/20 at 00:00;  Stop 1/ 8/20 at 00:29;  Status DC


Vancomycin HCl (Vanco Per Pharmacy) 1 each PRN DAILY  PRN MC SEE COMMENTS Last 

administered on 1/9/20at 12:03;  Start 1/7/20 at 23:45


Sodium Chloride 1,000 ml @  1,000 mls/hr Q1H IV  Last administered on 1/8/20at 

01:00;  Start 1/8/20 at 00:00;  Stop 1/8/20 at 01:29;  Status DC


Morphine Sulfate (Morphine Sulfate) 4 mg 1X  ONCE IV  Last administered on 1/8/2

0at 00:19;  Start 1/8/20 at 00:30;  Stop 1/8/20 at 18:38;  Status DC


Ondansetron HCl (Zofran) 4 mg 1X  ONCE IV  Last administered on 1/8/20at 00:19; 

Start 1/8/20 at 00:30;  Stop 1/8/20 at 00:31;  Status DC


Vancomycin HCl 2 gm/Sodium Chloride 500 ml @  250 mls/hr 1X  ONCE IV  Last 

administered on 1/8/20at 00:22;  Start 1/8/20 at 00:30;  Stop 1/8/20 at 02:29;  

Status DC


Vancomycin HCl 2 gm/Sodium Chloride 500 ml @  250 mls/hr Q12H IV  Last 

administered on 1/10/20at 00:47;  Start 1/8/20 at 12:00


Vancomycin HCl (Vancomycin Trough Level) 1 each 1X  ONCE MC ;  Start 1/9/20 at 

11:30;  Stop 1/9/20 at 11:31;  Status DC


Morphine Sulfate (Morphine Sulfate) 4 mg PRN Q4HRS  PRN IV SEVERE PAIN 7-10 Last

administered on 1/8/20at 14:47;  Start 1/8/20 at 04:45;  Stop 1/8/20 at 18:38;  

Status DC


Acetaminophen/ Hydrocodone Bitart (Lortab 7.5/325) 1 tab PRN Q4HRS  PRN PO 

MODERATE PAIN Last administered on 1/9/20at 08:16;  Start 1/8/20 at 08:15;  Stop

1/9/20 at 11:13;  Status DC


Amlodipine Besylate (Norvasc) 5 mg DAILY PO  Last administered on 1/9/20at 

08:20;  Start 1/8/20 at 09:00


Gabapentin (Neurontin) 900 mg TID PO  Last administered on 1/9/20at 21:34;  

Start 1/8/20 at 09:00


Acetaminophen/ Hydrocodone Bitart (Lortab 10/325) 1 tab PRN Q6HRS  PRN PO 

MODERATE PAIN Last administered on 1/8/20at 08:38;  Start 1/8/20 at 08:15;  Stop

1/8/20 at 16:09;  Status DC


Lisinopril (Prinivil) 40 mg DAILY PO  Last administered on 1/9/20at 08:15;  

Start 1/8/20 at 09:00


Methocarbamol (Robaxin) 750 mg PRN TID  PRN PO muscle spasms Last administered 

on 1/9/20at 08:15;  Start 1/8/20 at 08:15


Montelukast Sodium (Singulair) 10 mg DAILY PO  Last administered on 1/9/20at 

08:14;  Start 1/8/20 at 09:00


Nortriptyline HCl (Pamelor) 25 mg QHS PO  Last administered on 1/9/20at 21:34;  

Start 1/8/20 at 21:00


Non-Formulary Medication (Albuterol Sulfate (Ventolin Hfa Inhaler)) 2 puff Q4HRS

INH ;  Start 1/8/20 at 12:00;  Status UNV


Bupropion HCl (Wellbutrin Xl) 300 mg DAILY PO  Last administered on 1/9/20at 

08:14;  Start 1/8/20 at 09:00


Insulin Glargine (Lantus Syringe) 32 unit QHS SQ  Last administered on 1/9/20at 

22:05;  Start 1/8/20 at 21:00


Insulin Glargine (Lantus Syringe) 34 unit DAILYWBKFT SQ  Last administered on 

1/9/20at 08:13;  Start 1/8/20 at 08:30


Albuterol Sulfate (Ventolin Neb Soln) 2.5 mg Q4HRS NEB  Last administered on 

1/10/20at 00:13;  Start 1/8/20 at 08:45;  Stop 1/10/20 at 00:48;  Status DC


Cetirizine HCl (ZyrTEC) 10 mg DAILY PO  Last administered on 1/9/20at 08:15;  

Start 1/8/20 at 09:00


Lactobacillus Rhamnosus (Culturelle) 1 cap BID PO  Last administered on 1/9/20at

21:34;  Start 1/8/20 at 21:00


Insulin Human Lispro (HumaLOG) 0-9 UNITS TIDACHC SQ  Last administered on 1/9/2

0at 17:08;  Start 1/8/20 at 16:30


Dextrose (Dextrose 50%-Water Syringe) 12.5 gm PRN Q15MIN  PRN IV SEE COMMENTS;  

Start 1/8/20 at 14:45


Dextrose (Iv Dextrose 5%) 250 ml PRN Q15MIN  PRN IV SEE COMMENTS;  Start 1/8/20 

at 14:45


Enoxaparin Sodium (Lovenox 60mg Syringe) 60 mg Q12HR SQ  Last administered on 

1/9/20at 21:35;  Start 1/8/20 at 21:00


Ketorolac Tromethamine (Toradol 30mg Vial) 30 mg PRN Q6HRS  PRN IVP MODERATE 

PAIN Last administered on 1/10/20at 02:54;  Start 1/8/20 at 15:30;  Stop 1/13/20

at 15:29


Hydromorphone HCl (Dilaudid) 1 mg PRN Q4HRS  PRN IV SEVERE PAIN Last 

administered on 1/9/20at 10:24;  Start 1/8/20 at 16:15;  Stop 1/9/20 at 11:13;  

Status DC


Acetaminophen/ Hydrocodone Bitart (Lortab 7.5/325) 2 tab PRN Q4HRS  PRN PO 

MODERATE TO SEVERE PAIN Last administered on 1/10/20at 03:22;  Start 1/9/20 at 

11:15


Hydromorphone HCl (Dilaudid) 0.5 mg PRN Q4HRS  PRN IV SEVERE PAIN;  Start 1/9/20

at 11:15


Albuterol Sulfate (Ventolin Neb Soln) 2.5 mg RTQID NEB  Last administered on 

1/10/20at 07:19;  Start 1/10/20 at 08:00





Active Scripts


Active


Reported


Nortriptyline Hcl 25 Mg Capsule 1 Cap PO QHS


Celebrex (Celecoxib) 100 Mg Capsule 100 Mg PO BID 30 Days


Amlodipine Besylate 5 Mg Tablet 5 Mg PO DAILY


Humulin N Kwikpen (Nph, Human Insulin Isophane) 100 Unit/1 Ml Insuln.pen 32 Unit

SQ HS


Humulin N Kwikpen (Nph, Human Insulin Isophane) 100 Unit/1 Ml Insuln.pen 34 Unit

SQ DAILYWBKFT


Ventolin Hfa Inhaler (Albuterol Sulfate) 18 Gm Hfa.aer.ad 2 Puff INH Q4HRS


Lisinopril 40 Mg Tablet 40 Mg PO DAILY


Fenofibrate 160 Mg Tablet 160 Mg PO DAILY


Gabapentin 300 Mg Capsule 900 Mg PO TID


Zofran (Ondansetron Hcl) 4 Mg Tablet 1 Tab PO Q6HRS


Levsin (Hyoscyamine Sulfate) 0.125 Mg Tablet 1 Tab PO Q4HRS


Flonase Allergy Relief (Fluticasone Propionate) 9.9 Ml [place].susp 2 Sprays NS 

DAILY


Robaxin-750 (Methocarbamol) 750 Mg Tablet 1 Tab PO TID


Hydrocodone-Apap   ** (Hydrocodone Bit/Acetaminophen) 1 Each Tablet 1 Tab 

PO PRN Q6HRS PRN


Ranitidine Hcl 150 Mg Tablet 2 Tab PO BID


Wellbutrin Xl (Bupropion Hcl) 300 Mg Tab.er.24h 1 Tab PO DAILY


Lasix (Furosemide) 20 Mg Tablet 2 Tab PO DAILY


     may take additional tabs


Montelukast Sodium Tablet  ** (Montelukast Sodium) 10 Mg Tablet 1 Tab PO DAILY


Loratadine 10 Mg Tablet 1 Tab PO DAILY


Metformin Hcl 1,000 Mg Tablet 1,000 Mg PO BID


     resume Wednesday PM escamilla 12/25


Vitals/I & O





Vital Sign - Last 24 Hours








 1/9/20 1/9/20 1/9/20 1/9/20





 09:16 10:24 10:54 10:54


 


Resp 18 20  20


 


Pulse Ox   95 


 


O2 Delivery Nasal Cannula Nasal Cannula Nasal Cannula Nasal Cannula


 


O2 Flow Rate 1.0 1.0 1.0 1.0





 1/9/20 1/9/20 1/9/20 1/9/20





 11:00 12:05 13:05 15:00


 


Temp 99.0   97.9





 99.0   97.9


 


Pulse 105   103


 


Resp 18 18 18 18


 


B/P (MAP) 115/51 (72)   103/61 (75)


 


Pulse Ox 91   93


 


O2 Delivery Room Air  Nasal Cannula Room Air


 


O2 Flow Rate   1.0 


 


    





    





 1/9/20 1/9/20 1/9/20 1/9/20





 15:20 16:30 17:30 19:00


 


Temp    98.5





    98.5


 


Pulse    109


 


Resp  18 18 18


 


B/P (MAP)    127/70 (89)


 


Pulse Ox    90


 


O2 Delivery Room Air Room Air Room Air Room Air


 


    





    





 1/9/20 1/9/20 1/9/20 1/9/20





 19:49 20:15 22:01 23:00


 


O2 Delivery Room Air Nasal Cannula Nasal Cannula Nasal Cannula


 


O2 Flow Rate  1.0 1.0 1.0





 1/9/20 1/10/20 1/10/20 1/10/20





 23:00 00:14 03:00 03:22


 


Temp 98.4  98.8 





 98.4  98.8 


 


Pulse 109  117 


 


Resp 18  18 


 


B/P (MAP) 133/49 (77)  138/63 (88) 


 


Pulse Ox 90  92 


 


O2 Delivery Room Air Nasal Cannula Room Air Nasal Cannula


 


O2 Flow Rate  1.0  1.0


 


    





    





 1/10/20 1/10/20 1/10/20 





 04:22 07:00 07:20 


 


Temp  98.0  





  98.0  


 


Pulse  107  


 


Resp  18  


 


B/P (MAP)  113/68 (83)  


 


Pulse Ox  93 100 


 


O2 Delivery Nasal Cannula Nasal Cannula Nasal Cannula 


 


O2 Flow Rate 1.0 2.0 1.0 














Intake and Output   


 


 1/9/20 1/9/20 1/10/20





 15:00 23:00 07:00


 


Intake Total 900 ml  


 


Output Total 150 ml  


 


Balance 750 ml  

















AIME DIAZ MD        [place] 10, 2020 08:52
PROGRESS NOTES


Chief Complaint


Chief Complaint


A/P:


Cellulitis of right lower extremity - will start on empiric antibiotics to cover

strep and staph. Has risk for polymicrobial infection with diabetic and wound h

istory


Sepsis - will cont IVF and antibiotics


DM2 - basal bolus plus insulin. A1c was 10.1 last check


HTN - cont meds


HLD - cont statin/fibrate


Depression - cont wellbutrin


Chronic pain - will cont meds. On permanent disability for her back


Acute hypoxic respiratory failure - not on home O2, no formal COPD diagnosis 

that she knows of. Is a smoker, relapsed recently. Will obtain CXR


Smoker - counseled on cessation





FEN - ADA


PPX - Lovenox


FULL CODE


Dispo - inpatient for RLE cellulitis failing outpatient treatment





History of Present Illness


History of Present Illness


Ms Gutierrez 54-year-old female w/ PMHx DM2, PCOS, Depression, GERD, chronic pain on

permanent disability, smoker, RLE venous stasis who p/w lower extremity 

infection. Patient has as an outpatient and being treated for lower extremity 

wounds however she's noticed increasing erythema and pain to her right lower 

extremity. She did have a procedure done by Dr. Montemayor with angiography on 

December 23, 2018 which was negative for vascular occlusion.  Patient states the

wound care has been ongoing for 8 months.  She describes chills, pain to RLE, 

open wounds with drainage.  States she has had vascular imaging to eval arterial

blood flow which was negative and venous doppler negative for DVT.  Patient does

complain of nausea on exam


WBC 13.1, HR 120s, admitted for cellulitis failing outpatient treatment at wound

center.


1/10: Having more pain in leg today. Her hydrocodone was reduced overnight on 

call. Leg improved slightly. CXR with no infiltrate. No CP or SOB. Her abdominal

pain is improved. She still states she feels fever and chills. No documented fe

vers. She has extreme pain on attempting weight-bearing. ASO titer positive. 

MRSA Nares negative.





Leg looks better today. She feels improved. D/w ID consultation ok for PO zyvox,

augmentin, fluconazole trial today





Plan:


Will hopefully d/c in AM





Vitals


Vitals





Vital Signs








  Date Time  Temp Pulse Resp B/P (MAP) Pulse Ox O2 Delivery O2 Flow Rate FiO2


 


1/11/20 08:11     96 Nasal Cannula 1.0 


 


1/11/20 05:30   16     


 


1/11/20 03:45 97.8 107  131/70 (90)    





 97.8       











Physical Exam


General:  Alert, Oriented X3, Cooperative, No acute distress


Abdomen:  Normal bowel sounds, Soft, No tenderness, No hepatosplenomegaly, No 

masses


Extremities:  No clubbing, No cyanosis, Normal pulses, Other (RLE tender and 

swollen to thigh)


Skin:  No breakdown, Other (RLE with rash up to thigh, red, hot, swollen)





Labs


LABS





Laboratory Tests








Test


 1/10/20


11:10 1/10/20


18:05 1/10/20


20:38 1/11/20


05:15


 


Glucose (Fingerstick)


 178 mg/dL


(70-99) 224 mg/dL


(70-99) 155 mg/dL


(70-99) 





 


Creatinine


 


 


 


 1.1 mg/dL


(0.6-1.0)


 


Estimated GFR


(Cockcroft-Gault) 


 


 


 52.0 














Assessment and Plan


Assessmemt and Plan


Problems


Medical Problems:


(1) Cellulitis of right lower extremity


Status: Acute  





(2) Sepsis


Status: Acute  











Comment


Review of Relevant


I have reviewed the following items daniel (where applicable) has been applied.


Labs





Laboratory Tests








Test


 1/9/20


11:15 1/9/20


11:35 1/9/20


11:36 1/9/20


16:31


 


Vancomycin Level Trough


 20.4 mcg/mL


(10.0-20.0) 


 


 





 


Vancomycin Last Dose Date 1/9/20    


 


Vancomycin Last Dose Time 0000    


 


Anti-Streptolysin O Antibody


 57.0 IU/mL


(0.0-200.0) 


 


 





 


Nasal Screen MRSA (PCR)


 


 Negative


(Negative) 


 





 


Glucose (Fingerstick)


 


 


 185 mg/dL


(70-99) 153 mg/dL


(70-99)


 


Test


 1/9/20


20:59 1/10/20


04:43 1/10/20


07:29 1/10/20


11:10


 


Glucose (Fingerstick)


 169 mg/dL


(70-99) 


 154 mg/dL


(70-99) 178 mg/dL


(70-99)


 


White Blood Count


 


 10.8 x10^3/uL


(4.0-11.0) 


 





 


Red Blood Count


 


 3.57 x10^6/uL


(3.50-5.40) 


 





 


Hemoglobin


 


 10.4 g/dL


(12.0-15.5) 


 





 


Hematocrit


 


 31.4 %


(36.0-47.0) 


 





 


Mean Corpuscular Volume  88 fL ()   


 


Mean Corpuscular Hemoglobin  29 pg (25-35)   


 


Mean Corpuscular Hemoglobin


Concent 


 33 g/dL


(31-37) 


 





 


Red Cell Distribution Width


 


 15.6 %


(11.5-14.5) 


 





 


Platelet Count


 


 312 x10^3/uL


(140-400) 


 





 


Neutrophils (%) (Auto)  74 % (31-73)   


 


Lymphocytes (%) (Auto)  16 % (24-48)   


 


Monocytes (%) (Auto)  8 % (0-9)   


 


Eosinophils (%) (Auto)  1 % (0-3)   


 


Basophils (%) (Auto)  1 % (0-3)   


 


Neutrophils # (Auto)


 


 8.0 x10^3/uL


(1.8-7.7) 


 





 


Lymphocytes # (Auto)


 


 1.8 x10^3/uL


(1.0-4.8) 


 





 


Monocytes # (Auto)


 


 0.8 x10^3/uL


(0.0-1.1) 


 





 


Eosinophils # (Auto)


 


 0.1 x10^3/uL


(0.0-0.7) 


 





 


Basophils # (Auto)


 


 0.1 x10^3/uL


(0.0-0.2) 


 





 


Sodium Level


 


 138 mmol/L


(136-145) 


 





 


Potassium Level


 


 4.2 mmol/L


(3.5-5.1) 


 





 


Chloride Level


 


 104 mmol/L


() 


 





 


Carbon Dioxide Level


 


 23 mmol/L


(21-32) 


 





 


Anion Gap  11 (6-14)   


 


Blood Urea Nitrogen


 


 23 mg/dL


(7-20) 


 





 


Creatinine


 


 1.2 mg/dL


(0.6-1.0) 


 





 


Estimated GFR


(Cockcroft-Gault) 


 47.0 


 


 





 


Glucose Level


 


 175 mg/dL


(70-99) 


 





 


Calcium Level


 


 8.2 mg/dL


(8.5-10.1) 


 





 


Test


 1/10/20


18:05 1/10/20


20:38 1/11/20


05:15 





 


Glucose (Fingerstick)


 224 mg/dL


(70-99) 155 mg/dL


(70-99) 


 





 


Creatinine


 


 


 1.1 mg/dL


(0.6-1.0) 





 


Estimated GFR


(Cockcroft-Gault) 


 


 52.0 


 











Laboratory Tests








Test


 1/10/20


11:10 1/10/20


18:05 1/10/20


20:38 1/11/20


05:15


 


Glucose (Fingerstick)


 178 mg/dL


(70-99) 224 mg/dL


(70-99) 155 mg/dL


(70-99) 





 


Creatinine


 


 


 


 1.1 mg/dL


(0.6-1.0)


 


Estimated GFR


(Cockcroft-Gault) 


 


 


 52.0 











Microbiology


1/8/20 Blood Culture - Preliminary, Resulted


         NO GROWTH AFTER 2 DAYS


Medications





Current Medications


Piperacillin Sod/ Tazobactam Sod 4.5 gm/Sodium Chloride 100 ml @  200 mls/hr 1X 

ONCE IV  Last administered on 1/8/20at 00:18;  Start 1/8/20 at 00:00;  Stop 

1/8/20 at 00:29;  Status DC


Vancomycin HCl (Vanco Per Pharmacy) 1 each PRN DAILY  PRN MC SEE COMMENTS Last 

administered on 1/10/20at 11:26;  Start 1/7/20 at 23:45


Sodium Chloride 1,000 ml @  1,000 mls/hr Q1H IV  Last administered on 1/8/20at 

01:00;  Start 1/8/20 at 00:00;  Stop 1/8/20 at 01:29;  Status DC


Morphine Sulfate (Morphine Sulfate) 4 mg 1X  ONCE IV  Last administered on 

1/8/20at 00:19;  Start 1/8/20 at 00:30;  Stop 1/8/20 at 18:38;  Status DC


Ondansetron HCl (Zofran) 4 mg 1X  ONCE IV  Last administered on 1/8/20at 00:19; 

Start 1/8/20 at 00:30;  Stop 1/8/20 at 00:31;  Status DC


Vancomycin HCl 2 gm/Sodium Chloride 500 ml @  250 mls/hr 1X  ONCE IV  Last 

administered on 1/8/20at 00:22;  Start 1/8/20 at 00:30;  Stop 1/8/20 at 02:29;  

Status DC


Vancomycin HCl 2 gm/Sodium Chloride 500 ml @  250 mls/hr Q12H IV  Last 

administered on 1/10/20at 23:51;  Start 1/8/20 at 12:00


Vancomycin HCl (Vancomycin Trough Level) 1 each 1X  ONCE MC ;  Start 1/9/20 at 

11:30;  Stop 1/9/20 at 11:31;  Status DC


Morphine Sulfate (Morphine Sulfate) 4 mg PRN Q4HRS  PRN IV SEVERE PAIN 7-10 Last

administered on 1/8/20at 14:47;  Start 1/8/20 at 04:45;  Stop 1/8/20 at 18:38;  

Status DC


Acetaminophen/ Hydrocodone Bitart (Lortab 7.5/325) 1 tab PRN Q4HRS  PRN PO 

MODERATE PAIN Last administered on 1/9/20at 08:16;  Start 1/8/20 at 08:15;  Stop

1/9/20 at 11:13;  Status DC


Amlodipine Besylate (Norvasc) 5 mg DAILY PO  Last administered on 1/10/20at 

08:46;  Start 1/8/20 at 09:00


Gabapentin (Neurontin) 900 mg TID PO  Last administered on 1/10/20at 20:40;  

Start 1/8/20 at 09:00


Acetaminophen/ Hydrocodone Bitart (Lortab 10/325) 1 tab PRN Q6HRS  PRN PO 

MODERATE PAIN Last administered on 1/8/20at 08:38;  Start 1/8/20 at 08:15;  Stop

1/8/20 at 16:09;  Status DC


Lisinopril (Prinivil) 40 mg DAILY PO  Last administered on 1/10/20at 08:45;  

Start 1/8/20 at 09:00


Methocarbamol (Robaxin) 750 mg PRN TID  PRN PO muscle spasms Last administered 

on 1/9/20at 08:15;  Start 1/8/20 at 08:15


Montelukast Sodium (Singulair) 10 mg DAILY PO  Last administered on 1/10/20at 

08:45;  Start 1/8/20 at 09:00


Nortriptyline HCl (Pamelor) 25 mg QHS PO  Last administered on 1/10/20at 20:40; 

Start 1/8/20 at 21:00


Non-Formulary Medication (Albuterol Sulfate (Ventolin Hfa Inhaler)) 2 puff Q4HRS

INH ;  Start 1/8/20 at 12:00;  Status UNV


Bupropion HCl (Wellbutrin Xl) 300 mg DAILY PO  Last administered on 1/10/20at 

08:46;  Start 1/8/20 at 09:00


Insulin Glargine (Lantus Syringe) 32 unit QHS SQ  Last administered on 1/10/20at

20:45;  Start 1/8/20 at 21:00


Insulin Glargine (Lantus Syringe) 34 unit [id number] SQ  Last administered on 

1/10/20at 09:00;  Start 1/8/20 at 08:30


Albuterol Sulfate (Ventolin Neb Soln) 2.5 mg Q4HRS NEB  Last administered on 

1/10/20at 00:13;  Start 1/8/20 at 08:45;  Stop 1/10/20 at 00:48;  Status DC


Cetirizine HCl (ZyrTEC) 10 mg DAILY PO  Last administered on 1/10/20at 08:46;  

Start 1/8/20 at 09:00


Lactobacillus Rhamnosus (Culturelle) 1 cap BID PO  Last administered on 

1/10/20at 20:40;  Start 1/8/20 at 21:00


Insulin Human Lispro (HumaLOG) 0-9 UNITS TIDACHC SQ  Last administered on 

1/10/20at 18:56;  Start 1/8/20 at 16:30


Dextrose (Dextrose 50%-Water Syringe) 12.5 gm PRN Q15MIN  PRN IV SEE COMMENTS;  

Start 1/8/20 at 14:45


Dextrose (Iv Dextrose 5%) 250 ml PRN Q15MIN  PRN IV SEE COMMENTS;  Start 1/8/20 

at 14:45


Enoxaparin Sodium (Lovenox 60mg Syringe) 60 mg Q12HR SQ  Last administered on 

1/10/20at 20:42;  Start 1/8/20 at 21:00


Ketorolac Tromethamine (Toradol 30mg Vial) 30 mg PRN Q6HRS  PRN IVP MODERATE 

PAIN Last administered on 1/11/20at 04:26;  Start 1/8/20 at 15:30;  Stop 1/13/20

at 15:29


Hydromorphone HCl (Dilaudid) 1 mg PRN Q4HRS  PRN IV SEVERE PAIN Last 

administered on 1/9/20at 10:24;  Start 1/8/20 at 16:15;  Stop 1/9/20 at 11:13;  

Status DC


Acetaminophen/ Hydrocodone Bitart (Lortab 7.5/325) 2 tab PRN Q4HRS  PRN PO 

MODERATE TO SEVERE PAIN Last administered on 1/11/20at 04:30;  Start 1/9/20 at 

11:15


Hydromorphone HCl (Dilaudid) 0.5 mg PRN Q4HRS  PRN IV SEVERE PAIN;  Start 1/9/20

at 11:15


Albuterol Sulfate (Ventolin Neb Soln) 2.5 mg RTQID NEB  Last administered on 

1/11/20at 08:11;  Start 1/10/20 at 08:00





Active Scripts


Active


Reported


Nortriptyline Hcl 25 Mg Capsule 1 Cap PO QHS


Celebrex (Celecoxib) 100 Mg Capsule 100 Mg PO BID 30 Days


Amlodipine Besylate 5 Mg Tablet 5 Mg PO DAILY


Humulin N Kwikpen (Nph, Human Insulin Isophane) 100 Unit/1 Ml Insuln.pen 32 Unit

SQ HS


Humulin N Kwikpen (Nph, Human Insulin Isophane) 100 Unit/1 Ml Insuln.pen 34 Unit

SQ DAILYWBKFT


Ventolin Hfa Inhaler (Albuterol Sulfate) 18 Gm Hfa.aer.ad 2 Puff INH Q4HRS


Lisinopril 40 Mg Tablet 40 Mg PO DAILY


Fenofibrate 160 Mg Tablet 160 Mg PO DAILY


Gabapentin 300 Mg Capsule 900 Mg PO TID


Zofran (Ondansetron Hcl) 4 Mg Tablet 1 Tab PO Q6HRS


Levsin (Hyoscyamine Sulfate) 0.125 Mg Tablet 1 Tab PO Q4HRS


Flonase Allergy Relief (Fluticasone Propionate) 9.9 Ml [place].susp 2 Sprays NS 

DAILY


Robaxin-750 (Methocarbamol) 750 Mg Tablet 1 Tab PO TID


Hydrocodone-Apap   ** (Hydrocodone Bit/Acetaminophen) 1 Each Tablet 1 Tab 

PO PRN Q6HRS PRN


Ranitidine Hcl 150 Mg Tablet 2 Tab PO BID


Wellbutrin Xl (Bupropion Hcl) 300 Mg Tab.er.24h 1 Tab PO DAILY


Lasix (Furosemide) 20 Mg Tablet 2 Tab PO DAILY


     may take additional tabs


Montelukast Sodium Tablet  ** (Montelukast Sodium) 10 Mg Tablet 1 Tab PO DAILY


Loratadine 10 Mg Tablet 1 Tab PO DAILY


Metformin Hcl 1,000 Mg Tablet 1,000 Mg PO BID


     resume Wednesday PM escamilla 12/25


Vitals/I & O





Vital Sign - Last 24 Hours








 1/10/20 1/10/20 1/10/20 1/10/20





 08:45 08:46 09:26 10:26


 


Pulse 107 107  


 


B/P (MAP) 113/68 113/68  


 


O2 Delivery   Nasal Cannula Nasal Cannula


 


O2 Flow Rate   1.0 1.0





 1/10/20 1/10/20 1/10/20 1/10/20





 11:00 11:27 15:00 15:21


 


Temp 99.4  98.5 





 99.4  98.5 


 


Pulse 110  109 


 


Resp 20  18 


 


B/P (MAP) 114/51 (72)  126/71 (89) 


 


Pulse Ox 92 100 92 100


 


O2 Delivery Nasal Cannula Nasal Cannula Nasal Cannula Nasal Cannula


 


O2 Flow Rate 2.0 1.0 2.0 1.0


 


    





    





 1/10/20 1/10/20 1/10/20 1/10/20





 16:14 17:14 19:00 20:30


 


Temp   97.9 





   97.9 


 


Pulse   114 


 


Resp   20 


 


B/P (MAP)   134/63 (86) 


 


Pulse Ox   93 


 


O2 Delivery Nasal Cannula Nasal Cannula Nasal Cannula Nasal Cannula


 


O2 Flow Rate 1.0 1.0  1.0


 


    





    





 1/10/20 1/10/20 1/10/20 1/10/20





 20:41 21:12 21:41 23:00


 


Temp    98.0





    98.0


 


Pulse    105


 


Resp 20  18 20


 


B/P (MAP)    157/86 (109)


 


Pulse Ox  95  99


 


O2 Delivery Nasal Cannula Nasal Cannula Nasal Cannula Nasal Cannula


 


O2 Flow Rate 1.0 1.0 1.0 


 


    





    





 1/11/20 1/11/20 1/11/20 1/11/20





 00:48 01:48 03:45 04:30


 


Temp   97.8 





   97.8 


 


Pulse   107 


 


Resp 20 18 20 18


 


B/P (MAP)   131/70 (90) 


 


Pulse Ox   93 


 


O2 Delivery Nasal Cannula Nasal Cannula Nasal Cannula Nasal Cannula


 


O2 Flow Rate 1.0 1.0  1.0


 


    





    





 1/11/20 1/11/20  





 05:30 08:11  


 


Resp 16   


 


Pulse Ox  96  


 


O2 Delivery Nasal Cannula Nasal Cannula  


 


O2 Flow Rate 1.0 1.0  














Intake and Output   


 


 1/10/20 1/10/20 1/11/20





 15:00 23:00 07:00


 


Output Total   500 ml


 


Balance   -500 ml

















AIME DIAZ MD        Jan 11, 2020 08:23
PROGRESS NOTES


Chief Complaint


Chief Complaint


A/P:


Cellulitis of right lower extremity - will start on empiric antibiotics to cover

strep and staph. Has risk for polymicrobial infection with diabetic and wound h

istory


Sepsis - will cont IVF and antibiotics


DM2 - basal bolus plus insulin. A1c was 10.1 last check


HTN - cont meds


HLD - cont statin/fibrate


Depression - cont wellbutrin


Chronic pain - will cont meds. On permanent disability for her back


Acute hypoxic respiratory failure - not on home O2, no formal COPD diagnosis 

that she knows of. Is a smoker, relapsed recently. Will obtain CXR


Smoker - counseled on cessation


Migraine headaches





FEN - ADA


PPX - Lovenox


FULL CODE


Dispo - inpatient for RLE cellulitis failing outpatient treatment





History of Present Illness


History of Present Illness


Ms Gutierrez 54-year-old female w/ PMHx DM2, PCOS, Depression, GERD, chronic pain on

permanent disability, smoker, RLE venous stasis who p/w lower extremity 

infection. Patient has as an outpatient and being treated for lower extremity 

wounds however she's noticed increasing erythema and pain to her right lower 

extremity. She did have a procedure done by Dr. Montemayor with angiography on 

December 23, 2018 which was negative for vascular occlusion.  Patient states the

wound care has been ongoing for 8 months.  She describes chills, pain to RLE, 

open wounds with drainage.  States she has had vascular imaging to eval arterial

blood flow which was negative and venous doppler negative for DVT.  Patient does

complain of nausea on exam


WBC 13.1, HR 120s, admitted for cellulitis failing outpatient treatment at wound

center.


1/10: Having more pain in leg today. Her hydrocodone was reduced overnight on 

call. Leg improved slightly. CXR with no infiltrate. No CP or SOB. Her abdominal

pain is improved. She still states she feels fever and chills. No documented 

fevers. She has extreme pain on attempting weight-bearing. ASO titer positive. 

MRSA Nares negative.


1/11: Leg looks better today. She feels improved. D/w ID consultation ok for PO 

zyvox, augmentin, fluconazole trial today





Feeling a lot of pain with my dressing change today. Her RLE is weeping, 

swollen. Much less red, less warm. Her pain is better controlled, glucose better

controlled. Headache again today. On po antibiotics. She does not feel ready and

needs wound care reassessment.





Plan:


Tight compressive wrap with close wound care f/u


Start topiramate for migraine PPX and appetite suppression


Will hopefully d/c in AM





Vitals


Vitals





Vital Signs








  Date Time  Temp Pulse Resp B/P (MAP) Pulse Ox O2 Delivery O2 Flow Rate FiO2


 


1/12/20 07:36   20  96 Room Air 1.0 


 


1/12/20 03:16 97.7 97  112/58 (76)    





 97.7       











Physical Exam


General:  Alert, Oriented X3, Cooperative, No acute distress


Abdomen:  Normal bowel sounds, Soft, No tenderness, No hepatosplenomegaly, No 

masses


Extremities:  No clubbing, No cyanosis, Normal pulses, Other (RLE tender and 

swollen to thigh)


Skin:  No breakdown, Other (RLE with rash up to thigh, red, hot, swollen)





Labs


LABS





Laboratory Tests








Test


 1/11/20


08:38 1/11/20


11:34 1/11/20


17:13 1/11/20


20:36


 


Glucose (Fingerstick)


 171 mg/dL


(70-99) 155 mg/dL


(70-99) 179 mg/dL


(70-99) 164 mg/dL


(70-99)











Assessment and Plan


Assessmemt and Plan


Problems


Medical Problems:


(1) Cellulitis of right lower extremity


Status: Acute  





(2) Sepsis


Status: Acute  











Comment


Review of Relevant


I have reviewed the following items daniel (where applicable) has been applied.


Labs





Laboratory Tests








Test


 1/10/20


11:10 1/10/20


18:05 1/10/20


20:38 1/11/20


05:15


 


Glucose (Fingerstick)


 178 mg/dL


(70-99) 224 mg/dL


(70-99) 155 mg/dL


(70-99) 





 


Creatinine


 


 


 


 1.1 mg/dL


(0.6-1.0)


 


Estimated GFR


(Cockcroft-Gault) 


 


 


 52.0 





 


Test


 1/11/20


08:38 1/11/20


11:34 1/11/20


17:13 1/11/20


20:36


 


Glucose (Fingerstick)


 171 mg/dL


(70-99) 155 mg/dL


(70-99) 179 mg/dL


(70-99) 164 mg/dL


(70-99)








Laboratory Tests








Test


 1/11/20


08:38 1/11/20


11:34 1/11/20


17:13 1/11/20


20:36


 


Glucose (Fingerstick)


 171 mg/dL


(70-99) 155 mg/dL


(70-99) 179 mg/dL


(70-99) 164 mg/dL


(70-99)








Microbiology


1/8/20 Blood Culture - Preliminary, Resulted


         NO GROWTH AFTER 3 DAYS


Medications





Current Medications


Piperacillin Sod/ Tazobactam Sod 4.5 gm/Sodium Chloride 100 ml @  200 mls/hr 1X 

ONCE IV  Last administered on 1/8/20at 00:18;  Start 1/8/20 at 00:00;  Stop 

1/8/20 at 00:29;  Status DC


Vancomycin HCl (Vanco Per Pharmacy) 1 each PRN DAILY  PRN MC SEE COMMENTS Last 

administered on 1/11/20at 08:40;  Start 1/7/20 at 23:45;  Stop 1/11/20 at 11:48;

 Status DC


Sodium Chloride 1,000 ml @  1,000 mls/hr Q1H IV  Last administered on 1/8/20at 

01:00;  Start 1/8/20 at 00:00;  Stop 1/8/20 at 01:29;  Status DC


Morphine Sulfate (Morphine Sulfate) 4 mg 1X  ONCE IV  Last administered on 

1/8/20at 00:19;  Start 1/8/20 at 00:30;  Stop 1/8/20 at 18:38;  Status DC


Ondansetron HCl (Zofran) 4 mg 1X  ONCE IV  Last administered on 1/8/20at 00:19; 

Start 1/8/20 at 00:30;  Stop 1/8/20 at 00:31;  Status DC


Vancomycin HCl 2 gm/Sodium Chloride 500 ml @  250 mls/hr 1X  ONCE IV  Last 

administered on 1/8/20at 00:22;  Start 1/8/20 at 00:30;  Stop 1/8/20 at 02:29;  

Status DC


Vancomycin HCl 2 gm/Sodium Chloride 500 ml @  250 mls/hr Q12H IV  Last 

administered on 1/10/20at 23:51;  Start 1/8/20 at 12:00;  Stop 1/11/20 at 11:48;

 Status DC


Vancomycin HCl (Vancomycin Trough Level) 1 each 1X  ONCE MC ;  Start 1/9/20 at 

11:30;  Stop 1/9/20 at 11:31;  Status DC


Morphine Sulfate (Morphine Sulfate) 4 mg PRN Q4HRS  PRN IV SEVERE PAIN 7-10 Last

administered on 1/8/20at 14:47;  Start 1/8/20 at 04:45;  Stop 1/8/20 at 18:38;  

Status DC


Acetaminophen/ Hydrocodone Bitart (Lortab 7.5/325) 1 tab PRN Q4HRS  PRN PO 

MODERATE PAIN Last administered on 1/9/20at 08:16;  Start 1/8/20 at 08:15;  Stop

1/9/20 at 11:13;  Status DC


Amlodipine Besylate (Norvasc) 5 mg DAILY PO  Last administered on 1/11/20at 

08:47;  Start 1/8/20 at 09:00


Gabapentin (Neurontin) 900 mg TID PO  Last administered on 1/11/20at 20:49;  

Start 1/8/20 at 09:00


Acetaminophen/ Hydrocodone Bitart (Lortab 10/325) 1 tab PRN Q6HRS  PRN PO 

MODERATE PAIN Last administered on 1/8/20at 08:38;  Start 1/8/20 at 08:15;  Stop

1/8/20 at 16:09;  Status DC


Lisinopril (Prinivil) 40 mg DAILY PO  Last administered on 1/11/20at 08:47;  

Start 1/8/20 at 09:00


Methocarbamol (Robaxin) 750 mg PRN TID  PRN PO muscle spasms Last administered 

on 1/9/20at 08:15;  Start 1/8/20 at 08:15


Montelukast Sodium (Singulair) 10 mg DAILY PO  Last administered on 1/11/20at 

08:46;  Start 1/8/20 at 09:00


Nortriptyline HCl (Pamelor) 25 mg QHS PO  Last administered on 1/11/20at 20:49; 

Start 1/8/20 at 21:00


Non-Formulary Medication (Albuterol Sulfate (Ventolin Hfa Inhaler)) 2 puff Q4HRS

INH ;  Start 1/8/20 at 12:00;  Status UNV


Bupropion HCl (Wellbutrin Xl) 300 mg DAILY PO  Last administered on 1/11/20at 

08:47;  Start 1/8/20 at 09:00


Insulin Glargine (Lantus Syringe) 32 unit QHS SQ  Last administered on 1/11/20at

21:00;  Start 1/8/20 at 21:00


Insulin Glargine (Lantus Syringe) 34 unit DAILYWBKFT SQ  Last administered on 

1/11/20at 09:03;  Start 1/8/20 at 08:30


Albuterol Sulfate (Ventolin Neb Soln) 2.5 mg Q4HRS NEB  Last administered on 

1/10/20at 00:13;  Start 1/8/20 at 08:45;  Stop 1/10/20 at 00:48;  Status DC


Cetirizine HCl (ZyrTEC) 10 mg DAILY PO  Last administered on 1/11/20at 08:48;  

Start 1/8/20 at 09:00


Lactobacillus Rhamnosus (Culturelle) 1 cap BID PO  Last administered on 

1/11/20at 20:49;  Start 1/8/20 at 21:00


Insulin Human Lispro (HumaLOG) 0-9 UNITS TIDACHC SQ  Last administered on 

1/11/20at 17:17;  Start 1/8/20 at 16:30


Dextrose (Dextrose 50%-Water Syringe) 12.5 gm PRN Q15MIN  PRN IV SEE COMMENTS;  

Start 1/8/20 at 14:45


Dextrose (Iv Dextrose 5%) 250 ml PRN Q15MIN  PRN IV SEE COMMENTS;  Start 1/8/20 

at 14:45


Enoxaparin Sodium (Lovenox 60mg Syringe) 60 mg Q12HR SQ  Last administered on 

1/11/20at 20:50;  Start 1/8/20 at 21:00


Ketorolac Tromethamine (Toradol 30mg Vial) 30 mg PRN Q6HRS  PRN IVP MODERATE 

PAIN Last administered on 1/11/20at 04:26;  Start 1/8/20 at 15:30;  Stop 1/13/20

at 15:29


Hydromorphone HCl (Dilaudid) 1 mg PRN Q4HRS  PRN IV SEVERE PAIN Last 

administered on 1/9/20at 10:24;  Start 1/8/20 at 16:15;  Stop 1/9/20 at 11:13;  

Status DC


Acetaminophen/ Hydrocodone Bitart (Lortab 7.5/325) 2 tab PRN Q4HRS  PRN PO 

MODERATE TO SEVERE PAIN Last administered on 1/12/20at 07:36;  Start 1/9/20 at 

11:15


Hydromorphone HCl (Dilaudid) 0.5 mg PRN Q4HRS  PRN IV SEVERE PAIN;  Start 1/9/20

at 11:15


Albuterol Sulfate (Ventolin Neb Soln) 2.5 mg RTQID NEB  Last administered on 

1/11/20at 08:11;  Start 1/10/20 at 08:00;  Stop 1/11/20 at 08:23;  Status DC


Albuterol Sulfate (Ventolin Neb Soln) 2.5 mg PRN QID  PRN NEB shortness of 

breath Last administered on 1/11/20at 16:52;  Start 1/11/20 at 10:00


Linezolid (Zyvox) 600 mg BID PO  Last administered on 1/11/20at 20:49;  Start 

1/11/20 at 12:00


Amoxicillin/ Clavulanate Potassium (Augmentin 875/ 125mg) 1 tab BID PO  Last 

administered on 1/11/20at 20:50;  Start 1/11/20 at 12:00


Fluconazole (Diflucan) 200 mg DAILY PO  Last administered on 1/11/20at 12:15;  

Start 1/11/20 at 12:00





Active Scripts


Active


Reported


Nortriptyline Hcl 25 Mg Capsule 1 Cap PO QHS


Celebrex (Celecoxib) 100 Mg Capsule 100 Mg PO BID 30 Days


Amlodipine Besylate 5 Mg Tablet 5 Mg PO DAILY


Humulin N Kwikpen (Nph, Human Insulin Isophane) 100 Unit/1 Ml Insuln.pen 32 Unit

SQ HS


Humulin N Kwikpen (Nph, Human Insulin Isophane) 100 Unit/1 Ml Insuln.pen 34 Unit

SQ DAILYWBKFT


Ventolin Hfa Inhaler (Albuterol Sulfate) 18 Gm Hfa.aer.ad 2 Puff INH Q4HRS


Lisinopril 40 Mg Tablet 40 Mg PO DAILY


Fenofibrate 160 Mg Tablet 160 Mg PO DAILY


Gabapentin 300 Mg Capsule 900 Mg PO TID


Zofran (Ondansetron Hcl) 4 Mg Tablet 1 Tab PO Q6HRS


Levsin (Hyoscyamine Sulfate) 0.125 Mg Tablet 1 Tab PO Q4HRS


Flonase Allergy Relief (Fluticasone Propionate) 9.9 Ml [place].susp 2 Sprays NS 

DAILY


Robaxin-750 (Methocarbamol) 750 Mg Tablet 1 Tab PO TID


Hydrocodone-Apap   ** (Hydrocodone Bit/Acetaminophen) 1 Each Tablet 1 Tab 

PO PRN Q6HRS PRN


Ranitidine Hcl 150 Mg Tablet 2 Tab PO BID


Wellbutrin Xl (Bupropion Hcl) 300 Mg Tab.er.24h 1 Tab PO DAILY


Lasix (Furosemide) 20 Mg Tablet 2 Tab PO DAILY


     may take additional tabs


Montelukast Sodium Tablet  ** (Montelukast Sodium) 10 Mg Tablet 1 Tab PO DAILY


Loratadine 10 Mg Tablet 1 Tab PO DAILY


Metformin Hcl 1,000 Mg Tablet 1,000 Mg PO BID


     resume Wednesday PM escamilla 12/25


Vitals/I & O





Vital Sign - Last 24 Hours








 1/11/20 1/11/20 1/11/20 1/11/20





 08:00 08:11 08:47 08:47


 


Pulse   107 107


 


B/P (MAP)   131/70 131/70


 


Pulse Ox  96  


 


O2 Delivery Room Air Nasal Cannula  


 


O2 Flow Rate  1.0  





 1/11/20 1/11/20 1/11/20 1/11/20





 08:47 09:47 11:00 12:27


 


Temp   98.1 





   98.1 


 


Pulse   99 


 


Resp   18 


 


B/P (MAP)   124/74 (91) 


 


Pulse Ox   94 94


 


O2 Delivery Nasal Cannula Room Air Nasal Cannula Room Air


 


    





    





 1/11/20 1/11/20 1/11/20 1/11/20





 14:16 15:16 15:59 16:52


 


Temp   97.9 





   97.9 


 


Pulse   100 


 


Resp   20 


 


B/P (MAP)   83/46 (58) 


 


Pulse Ox   91 96


 


O2 Delivery Nasal Cannula Room Air Nasal Cannula Room Air


 


O2 Flow Rate   2.0 1.0


 


    





    





 1/11/20 1/11/20 1/11/20 1/11/20





 19:00 20:00 21:03 23:00


 


Temp 97.7   98.3





 97.7   98.3


 


Pulse 100   96


 


Resp 20  20 20


 


B/P (MAP) 145/72 (96)   133/72 (92)


 


Pulse Ox 95  95 91


 


O2 Delivery Nasal Cannula Room Air Room Air Nasal Cannula


 


    





    





 1/12/20 1/12/20 1/12/20 





 03:16 03:17 07:36 


 


Temp 97.7   





 97.7   


 


Pulse 97   


 


Resp 20 18 20 


 


B/P (MAP) 112/58 (76)   


 


Pulse Ox 96 96 96 


 


O2 Delivery Nasal Cannula Room Air Room Air 


 


O2 Flow Rate  1.0 1.0 














Intake and Output   


 


 1/11/20 1/11/20 1/12/20





 15:00 23:00 07:00


 


Intake Total 480 ml 240 ml 


 


Output Total   500 ml


 


Balance 480 ml 240 ml -500 ml

















AIME DIAZ MD        Jan 12, 2020 07:37
2

## 2020-01-13 NOTE — NUR
Discharge Note: PT DISCHARGED HOME WITH SELF CARE. PT LEFT FACILITY VIA PRIVATE VEHICLE WITH 
DAUGHTER AT 1545. PT STABLE AND ALERT UPON DISCHARGE. PT PIV REMOVED FROM L FA WITHOUT 
COMPLICATIONS, BANDAGE APPLIED. PT EDUCATED ABOUT DISCHARGE MEDICATIONS, DISCHARGE 
INSTRUCTIONS, AND FOLLOW-UP CARE. PT VOICED NO CONCERNS AT THIS TIME. WOUND DRESSING CHANGED 
PER ORDER AND PICTURES TAKEN ON 1/12/20 AT 2100. PT LEFT WITH ALL PERSONAL BELONGINGS. PT 
INFORMED THAT WOUND CARE CLINIC WOULD BE IN TOUCH TO SCHEDULE AN APPOINTMENT TO FOLLOW-UP 
WITH THEM. 



LEXI NEVAREZ



Discharge instructions and discharge home medications reviewed with Patient and a copy 
given. All questions have been answered and understanding verbalized.

## 2020-03-18 ENCOUNTER — HOSPITAL ENCOUNTER (OUTPATIENT)
Dept: HOSPITAL 61 - US | Age: 55
Discharge: HOME | End: 2020-03-18
Attending: INTERNAL MEDICINE
Payer: COMMERCIAL

## 2020-03-18 DIAGNOSIS — I83.019: Primary | ICD-10-CM

## 2020-03-18 PROCEDURE — 93971 EXTREMITY STUDY: CPT

## 2020-03-19 NOTE — RAD
MR#: [id number]

Account#: [id number]

Accession#: [id number]

Date of Study: 03/18/2020

Ordering Physician: LINDA JANE, 

Referring Physician: LINDA JANE, 

Tech: Ryann Gan RDMS, NAHUMT, RTR





--------------- APPROVED REPORT --------------





Patient Location : OUT-PATIENT



Indications

Venous Ulcers



Findings

Limited evaluation of the right ankle area reveals 2 perforators at the site of the overall wound bas
ed on description. The first  is approximately 13 cm up and measures 2.7 mm in diameter and
 the second  at as approximately 15 cm up and measures 2.9 mm in diameter.



Critical Notification

Critical Value: No



<Conclusion>

1. To localize perforators at the level of the right ankle wound on the medial aspect.



Signed by : Pablo Crocker, 

Electronically Approved : 03/19/2020 14:41:43

## 2020-10-19 ENCOUNTER — HOSPITAL ENCOUNTER (OUTPATIENT)
Dept: HOSPITAL 61 - LAB | Age: 55
End: 2020-10-19
Attending: INTERNAL MEDICINE
Payer: COMMERCIAL

## 2020-10-19 DIAGNOSIS — Z20.828: ICD-10-CM

## 2020-10-19 DIAGNOSIS — Z01.812: Primary | ICD-10-CM

## 2020-10-19 DIAGNOSIS — R13.10: ICD-10-CM

## 2020-10-21 ENCOUNTER — HOSPITAL ENCOUNTER (OUTPATIENT)
Dept: HOSPITAL 61 - ENDOS | Age: 55
End: 2020-10-21
Attending: INTERNAL MEDICINE
Payer: COMMERCIAL

## 2020-10-21 VITALS
SYSTOLIC BLOOD PRESSURE: 145 MMHG | DIASTOLIC BLOOD PRESSURE: 68 MMHG | SYSTOLIC BLOOD PRESSURE: 145 MMHG | DIASTOLIC BLOOD PRESSURE: 68 MMHG

## 2020-10-21 DIAGNOSIS — E78.5: ICD-10-CM

## 2020-10-21 DIAGNOSIS — F32.9: ICD-10-CM

## 2020-10-21 DIAGNOSIS — J45.909: ICD-10-CM

## 2020-10-21 DIAGNOSIS — M19.90: ICD-10-CM

## 2020-10-21 DIAGNOSIS — Z79.84: ICD-10-CM

## 2020-10-21 DIAGNOSIS — I10: ICD-10-CM

## 2020-10-21 DIAGNOSIS — K21.9: ICD-10-CM

## 2020-10-21 DIAGNOSIS — F41.9: ICD-10-CM

## 2020-10-21 DIAGNOSIS — F17.210: ICD-10-CM

## 2020-10-21 DIAGNOSIS — E78.00: ICD-10-CM

## 2020-10-21 DIAGNOSIS — Z79.899: ICD-10-CM

## 2020-10-21 DIAGNOSIS — Z83.3: ICD-10-CM

## 2020-10-21 DIAGNOSIS — E11.9: ICD-10-CM

## 2020-10-21 DIAGNOSIS — K92.2: Primary | ICD-10-CM

## 2020-10-21 PROCEDURE — 43450 DILATE ESOPHAGUS 1/MULT PASS: CPT

## 2020-10-21 PROCEDURE — 82962 GLUCOSE BLOOD TEST: CPT

## 2022-03-30 ENCOUNTER — HOSPITAL ENCOUNTER (OUTPATIENT)
Dept: HOSPITAL 61 - KCIC | Age: 57
End: 2022-03-30
Attending: FAMILY MEDICINE
Payer: COMMERCIAL

## 2022-03-30 DIAGNOSIS — F17.200: ICD-10-CM

## 2022-03-30 DIAGNOSIS — M47.814: ICD-10-CM

## 2022-03-30 DIAGNOSIS — R91.8: Primary | ICD-10-CM

## 2022-03-30 PROCEDURE — 71046 X-RAY EXAM CHEST 2 VIEWS: CPT

## 2022-03-31 NOTE — [facility]
EXAMINATION: XR CHEST 2V



CLINICAL HISTORY: DYSPNEA ON EXERTION X'S MONTHS, ASTHMA, SMOKER 



EXAM DATE/TIME: 3/30/2022 3:07 PM



COMPARISON: 1/8/2020





FINDINGS: 



Lines, Tubes, and Devices: None.



Cardiomediastinal Silhouette: Within normal limits.



Lungs and Pleura: Mild patchy opacities in the right lower lung zone. No evidence of pleural effusion
. Pulmonary vasculature unremarkable.



Bones and Soft Tissues: Degenerative changes in the thoracic spine.

 



IMPRESSION:



Mild patchy airspace disease in the right lower lung zone.



Electronically signed by: Evelio Crump DO (3/31/2022 4:37 AM) [facility]